# Patient Record
Sex: FEMALE | Race: WHITE | NOT HISPANIC OR LATINO
[De-identification: names, ages, dates, MRNs, and addresses within clinical notes are randomized per-mention and may not be internally consistent; named-entity substitution may affect disease eponyms.]

---

## 2017-01-04 LAB — INR PPP: 2.6

## 2017-01-10 ENCOUNTER — RESULT REVIEW (OUTPATIENT)
Age: 77
End: 2017-01-10

## 2017-01-13 LAB
INR PPP: 2.5
PROTHROMBIN TIME COMMENT: NORMAL

## 2017-01-16 ENCOUNTER — RESULT REVIEW (OUTPATIENT)
Age: 77
End: 2017-01-16

## 2017-01-16 LAB
INR PPP: 2.4
PROTHROMBIN TIME COMMENT: NORMAL

## 2017-01-24 LAB — INR PPP: 2.3

## 2017-01-31 ENCOUNTER — MOBILE ON CALL (OUTPATIENT)
Age: 77
End: 2017-01-31

## 2017-02-15 LAB — INR PPP: 2.2

## 2017-02-21 ENCOUNTER — MEDICATION RENEWAL (OUTPATIENT)
Age: 77
End: 2017-02-21

## 2017-02-22 ENCOUNTER — MEDICATION RENEWAL (OUTPATIENT)
Age: 77
End: 2017-02-22

## 2017-02-22 LAB — INR PPP: 2.9

## 2017-02-23 LAB — INR PPP: 2.4

## 2017-03-16 ENCOUNTER — RESULT REVIEW (OUTPATIENT)
Age: 77
End: 2017-03-16

## 2017-03-17 LAB
INR PPP: 2.4
PROTHROMBIN TIME COMMENT: NORMAL

## 2017-03-22 LAB — INR PPP: 2.1

## 2017-03-28 ENCOUNTER — RESULT REVIEW (OUTPATIENT)
Age: 77
End: 2017-03-28

## 2017-03-29 LAB
INR PPP: 2.3
PROTHROMBIN TIME COMMENT: NORMAL

## 2017-04-12 ENCOUNTER — RESULT REVIEW (OUTPATIENT)
Age: 77
End: 2017-04-12

## 2017-04-12 LAB
INR PPP: 2.2
PROTHROMBIN TIME COMMENT: NORMAL

## 2017-04-17 ENCOUNTER — APPOINTMENT (OUTPATIENT)
Dept: CARDIOLOGY | Facility: CLINIC | Age: 77
End: 2017-04-17

## 2017-04-18 LAB — INR PPP: 1.9

## 2017-04-25 LAB — INR PPP: 2.6

## 2017-05-04 LAB — INR PPP: 2.3

## 2017-05-09 ENCOUNTER — RESULT REVIEW (OUTPATIENT)
Age: 77
End: 2017-05-09

## 2017-05-09 LAB
INR PPP: 2.3
PROTHROMBIN TIME COMMENT: NORMAL

## 2017-05-21 ENCOUNTER — CLINICAL ADVICE (OUTPATIENT)
Age: 77
End: 2017-05-21

## 2017-05-24 LAB — INR PPP: 2.6

## 2017-05-30 LAB
INR PPP: 1.4
PROTHROMBIN TIME COMMENT: NORMAL

## 2017-06-12 LAB — INR PPP: 2.3

## 2017-06-14 ENCOUNTER — APPOINTMENT (OUTPATIENT)
Dept: CARDIOLOGY | Facility: CLINIC | Age: 77
End: 2017-06-14

## 2017-06-14 ENCOUNTER — NON-APPOINTMENT (OUTPATIENT)
Age: 77
End: 2017-06-14

## 2017-06-14 VITALS
OXYGEN SATURATION: 98 % | BODY MASS INDEX: 25.52 KG/M2 | HEART RATE: 80 BPM | HEIGHT: 60 IN | DIASTOLIC BLOOD PRESSURE: 83 MMHG | WEIGHT: 130 LBS | SYSTOLIC BLOOD PRESSURE: 130 MMHG

## 2017-06-22 LAB
INR PPP: 2.4
PROTHROMBIN TIME COMMENT: NORMAL

## 2017-06-26 ENCOUNTER — RESULT REVIEW (OUTPATIENT)
Age: 77
End: 2017-06-26

## 2017-06-26 LAB
INR PPP: 2.4
PROTHROMBIN TIME COMMENT: NORMAL

## 2017-07-19 LAB — INR PPP: 2.2

## 2017-07-27 LAB — INR PPP: 2.4

## 2017-07-31 ENCOUNTER — MEDICATION RENEWAL (OUTPATIENT)
Age: 77
End: 2017-07-31

## 2017-08-01 LAB — INR PPP: 2

## 2017-08-08 ENCOUNTER — RESULT REVIEW (OUTPATIENT)
Age: 77
End: 2017-08-08

## 2017-08-09 LAB
INR PPP: 2.5
PROTHROMBIN TIME COMMENT: NORMAL

## 2017-08-14 ENCOUNTER — RESULT REVIEW (OUTPATIENT)
Age: 77
End: 2017-08-14

## 2017-08-17 LAB
INR PPP: 2.7
PROTHROMBIN TIME COMMENT: NORMAL

## 2017-08-22 LAB
INR PPP: 2
PROTHROMBIN TIME COMMENT: NORMAL

## 2017-08-28 ENCOUNTER — RESULT REVIEW (OUTPATIENT)
Age: 77
End: 2017-08-28

## 2017-08-28 LAB
INR PPP: 1.9
PROTHROMBIN TIME COMMENT: NORMAL

## 2017-09-06 LAB — INR PPP: 2.4

## 2017-09-11 ENCOUNTER — RESULT REVIEW (OUTPATIENT)
Age: 77
End: 2017-09-11

## 2017-09-12 LAB
INR PPP: 2.2
PROTHROMBIN TIME COMMENT: NORMAL

## 2017-09-13 ENCOUNTER — NON-APPOINTMENT (OUTPATIENT)
Age: 77
End: 2017-09-13

## 2017-09-13 ENCOUNTER — APPOINTMENT (OUTPATIENT)
Dept: CARDIOLOGY | Facility: CLINIC | Age: 77
End: 2017-09-13
Payer: MEDICARE

## 2017-09-13 VITALS
HEIGHT: 60 IN | SYSTOLIC BLOOD PRESSURE: 110 MMHG | WEIGHT: 129 LBS | HEART RATE: 76 BPM | BODY MASS INDEX: 25.32 KG/M2 | DIASTOLIC BLOOD PRESSURE: 68 MMHG

## 2017-09-13 PROCEDURE — 93000 ELECTROCARDIOGRAM COMPLETE: CPT

## 2017-09-13 PROCEDURE — 99215 OFFICE O/P EST HI 40 MIN: CPT

## 2017-09-19 LAB — INR PPP: 2.2

## 2017-09-27 LAB — INR PPP: 2.1

## 2017-10-05 ENCOUNTER — MEDICATION RENEWAL (OUTPATIENT)
Age: 77
End: 2017-10-05

## 2017-10-09 ENCOUNTER — RESULT REVIEW (OUTPATIENT)
Age: 77
End: 2017-10-09

## 2017-10-10 LAB
INR PPP: 2.1
PROTHROMBIN TIME COMMENT: NORMAL

## 2017-10-16 ENCOUNTER — RESULT REVIEW (OUTPATIENT)
Age: 77
End: 2017-10-16

## 2017-10-18 LAB
INR PPP: 2.8
PROTHROMBIN TIME COMMENT: NORMAL

## 2017-10-23 ENCOUNTER — RESULT REVIEW (OUTPATIENT)
Age: 77
End: 2017-10-23

## 2017-10-24 ENCOUNTER — NON-APPOINTMENT (OUTPATIENT)
Age: 77
End: 2017-10-24

## 2017-10-24 ENCOUNTER — APPOINTMENT (OUTPATIENT)
Dept: CARDIOLOGY | Facility: CLINIC | Age: 77
End: 2017-10-24
Payer: MEDICARE

## 2017-10-24 VITALS
OXYGEN SATURATION: 97 % | BODY MASS INDEX: 25.72 KG/M2 | WEIGHT: 131 LBS | HEART RATE: 76 BPM | SYSTOLIC BLOOD PRESSURE: 126 MMHG | DIASTOLIC BLOOD PRESSURE: 80 MMHG | HEIGHT: 60 IN

## 2017-10-24 DIAGNOSIS — Z79.899 OTHER LONG TERM (CURRENT) DRUG THERAPY: ICD-10-CM

## 2017-10-24 DIAGNOSIS — I50.9 HEART FAILURE, UNSPECIFIED: ICD-10-CM

## 2017-10-24 LAB
INR PPP: 3
PROTHROMBIN TIME COMMENT: NORMAL

## 2017-10-24 PROCEDURE — 36415 COLL VENOUS BLD VENIPUNCTURE: CPT

## 2017-10-24 PROCEDURE — 99215 OFFICE O/P EST HI 40 MIN: CPT

## 2017-10-24 PROCEDURE — 93000 ELECTROCARDIOGRAM COMPLETE: CPT

## 2017-10-25 PROBLEM — I50.9 ACUTE DECOMPENSATED HEART FAILURE: Status: ACTIVE | Noted: 2017-10-25

## 2017-10-25 PROBLEM — Z79.899 HIGH RISK MEDICATION USE: Status: ACTIVE | Noted: 2017-10-24

## 2017-10-25 LAB
ALBUMIN SERPL ELPH-MCNC: 4.2 G/DL
ALP BLD-CCNC: 130 U/L
ALT SERPL-CCNC: 93 U/L
ANION GAP SERPL CALC-SCNC: 16 MMOL/L
AST SERPL-CCNC: 52 U/L
BASOPHILS # BLD AUTO: 0.02 K/UL
BASOPHILS NFR BLD AUTO: 0.2 %
BILIRUB SERPL-MCNC: 0.5 MG/DL
BUN SERPL-MCNC: 21 MG/DL
CALCIUM SERPL-MCNC: 10.4 MG/DL
CHLORIDE SERPL-SCNC: 99 MMOL/L
CO2 SERPL-SCNC: 28 MMOL/L
CREAT SERPL-MCNC: 0.84 MG/DL
EOSINOPHIL # BLD AUTO: 0.08 K/UL
EOSINOPHIL NFR BLD AUTO: 1 %
GLUCOSE SERPL-MCNC: 94 MG/DL
HCT VFR BLD CALC: 40.7 %
HGB BLD-MCNC: 13.8 G/DL
IMM GRANULOCYTES NFR BLD AUTO: 0.1 %
LYMPHOCYTES # BLD AUTO: 2.5 K/UL
LYMPHOCYTES NFR BLD AUTO: 30.3 %
MAGNESIUM SERPL-MCNC: 2.2 MG/DL
MAN DIFF?: NORMAL
MCHC RBC-ENTMCNC: 33.1 PG
MCHC RBC-ENTMCNC: 33.9 GM/DL
MCV RBC AUTO: 97.6 FL
MONOCYTES # BLD AUTO: 0.48 K/UL
MONOCYTES NFR BLD AUTO: 5.8 %
NEUTROPHILS # BLD AUTO: 5.16 K/UL
NEUTROPHILS NFR BLD AUTO: 62.6 %
NT-PROBNP SERPL-MCNC: 1524 PG/ML
PLATELET # BLD AUTO: 194 K/UL
POTASSIUM SERPL-SCNC: 3.5 MMOL/L
PROT SERPL-MCNC: 7.5 G/DL
RBC # BLD: 4.17 M/UL
RBC # FLD: 14.9 %
SODIUM SERPL-SCNC: 143 MMOL/L
WBC # FLD AUTO: 8.25 K/UL

## 2017-10-27 ENCOUNTER — APPOINTMENT (OUTPATIENT)
Dept: CARDIOLOGY | Facility: CLINIC | Age: 77
End: 2017-10-27
Payer: MEDICARE

## 2017-10-27 PROCEDURE — 93306 TTE W/DOPPLER COMPLETE: CPT

## 2017-11-02 ENCOUNTER — APPOINTMENT (OUTPATIENT)
Dept: CARDIOLOGY | Facility: CLINIC | Age: 77
End: 2017-11-02
Payer: MEDICARE

## 2017-11-02 VITALS
HEART RATE: 70 BPM | SYSTOLIC BLOOD PRESSURE: 102 MMHG | DIASTOLIC BLOOD PRESSURE: 70 MMHG | BODY MASS INDEX: 24.94 KG/M2 | HEIGHT: 60 IN | WEIGHT: 127 LBS

## 2017-11-02 LAB — INR PPP: 2.1

## 2017-11-02 PROCEDURE — 36415 COLL VENOUS BLD VENIPUNCTURE: CPT

## 2017-11-02 PROCEDURE — 99215 OFFICE O/P EST HI 40 MIN: CPT

## 2017-11-03 LAB
ALBUMIN SERPL ELPH-MCNC: 4.3 G/DL
ALP BLD-CCNC: 87 U/L
ALT SERPL-CCNC: 37 U/L
ANION GAP SERPL CALC-SCNC: 17 MMOL/L
AST SERPL-CCNC: 44 U/L
BILIRUB DIRECT SERPL-MCNC: 0.1 MG/DL
BILIRUB INDIRECT SERPL-MCNC: 0.5 MG/DL
BILIRUB SERPL-MCNC: 0.6 MG/DL
BUN SERPL-MCNC: 18 MG/DL
CALCIUM SERPL-MCNC: 9.9 MG/DL
CHLORIDE SERPL-SCNC: 95 MMOL/L
CO2 SERPL-SCNC: 27 MMOL/L
CREAT SERPL-MCNC: 0.47 MG/DL
GLUCOSE SERPL-MCNC: 81 MG/DL
MAGNESIUM SERPL-MCNC: 2.2 MG/DL
POTASSIUM SERPL-SCNC: 3.9 MMOL/L
PROT SERPL-MCNC: 7.7 G/DL
SODIUM SERPL-SCNC: 139 MMOL/L

## 2017-11-13 ENCOUNTER — OUTPATIENT (OUTPATIENT)
Dept: OUTPATIENT SERVICES | Facility: HOSPITAL | Age: 77
LOS: 1 days | End: 2017-11-13
Payer: MEDICARE

## 2017-11-13 ENCOUNTER — APPOINTMENT (OUTPATIENT)
Dept: CV DIAGNOSITCS | Facility: HOSPITAL | Age: 77
End: 2017-11-13

## 2017-11-13 ENCOUNTER — CLINICAL ADVICE (OUTPATIENT)
Age: 77
End: 2017-11-13

## 2017-11-13 DIAGNOSIS — I48.91 UNSPECIFIED ATRIAL FIBRILLATION: ICD-10-CM

## 2017-11-13 DIAGNOSIS — I34.0 NONRHEUMATIC MITRAL (VALVE) INSUFFICIENCY: ICD-10-CM

## 2017-11-13 DIAGNOSIS — I50.9 HEART FAILURE, UNSPECIFIED: ICD-10-CM

## 2017-11-13 LAB
INR BLD: 2.21 — HIGH (ref 0.88–1.17)
INR PPP: 2.1
PROTHROM AB SERPL-ACNC: 25.2 SEC — HIGH (ref 9.8–13.1)

## 2017-11-13 PROCEDURE — 93312 ECHO TRANSESOPHAGEAL: CPT | Mod: 26

## 2017-11-13 PROCEDURE — 93306 TTE W/DOPPLER COMPLETE: CPT | Mod: 26

## 2017-11-13 PROCEDURE — 76376 3D RENDER W/INTRP POSTPROCES: CPT | Mod: 26

## 2017-11-13 RX ORDER — SODIUM CHLORIDE 9 MG/ML
3 INJECTION INTRAMUSCULAR; INTRAVENOUS; SUBCUTANEOUS ONCE
Qty: 0 | Refills: 0 | Status: DISCONTINUED | OUTPATIENT
Start: 2017-11-13 | End: 2017-11-28

## 2017-11-14 ENCOUNTER — OTHER (OUTPATIENT)
Age: 77
End: 2017-11-14

## 2017-11-14 ENCOUNTER — APPOINTMENT (OUTPATIENT)
Dept: CARDIOLOGY | Facility: CLINIC | Age: 77
End: 2017-11-14

## 2017-11-14 ENCOUNTER — APPOINTMENT (OUTPATIENT)
Dept: CARDIOTHORACIC SURGERY | Facility: CLINIC | Age: 77
End: 2017-11-14

## 2017-11-21 ENCOUNTER — RESULT REVIEW (OUTPATIENT)
Age: 77
End: 2017-11-21

## 2017-11-21 LAB
INR PPP: 2.2
PROTHROMBIN TIME COMMENT: NORMAL

## 2017-11-27 LAB — INR PPP: 1.3

## 2017-11-28 ENCOUNTER — RESULT REVIEW (OUTPATIENT)
Age: 77
End: 2017-11-28

## 2017-11-28 ENCOUNTER — OUTPATIENT (OUTPATIENT)
Dept: OUTPATIENT SERVICES | Facility: HOSPITAL | Age: 77
LOS: 1 days | End: 2017-11-28
Payer: MEDICARE

## 2017-11-28 DIAGNOSIS — Z80.0 FAMILY HISTORY OF MALIGNANT NEOPLASM OF DIGESTIVE ORGANS: ICD-10-CM

## 2017-11-28 DIAGNOSIS — K58.0 IRRITABLE BOWEL SYNDROME WITH DIARRHEA: ICD-10-CM

## 2017-11-28 PROCEDURE — 88305 TISSUE EXAM BY PATHOLOGIST: CPT

## 2017-11-28 PROCEDURE — 88305 TISSUE EXAM BY PATHOLOGIST: CPT | Mod: 26

## 2017-11-28 PROCEDURE — 45380 COLONOSCOPY AND BIOPSY: CPT

## 2017-11-29 LAB — SURGICAL PATHOLOGY STUDY: SIGNIFICANT CHANGE UP

## 2017-12-04 ENCOUNTER — NON-APPOINTMENT (OUTPATIENT)
Age: 77
End: 2017-12-04

## 2017-12-04 ENCOUNTER — APPOINTMENT (OUTPATIENT)
Dept: CARDIOLOGY | Facility: CLINIC | Age: 77
End: 2017-12-04
Payer: MEDICARE

## 2017-12-04 VITALS
DIASTOLIC BLOOD PRESSURE: 70 MMHG | SYSTOLIC BLOOD PRESSURE: 100 MMHG | HEIGHT: 60 IN | WEIGHT: 125 LBS | BODY MASS INDEX: 24.54 KG/M2

## 2017-12-04 LAB
INR PPP: 1.7
PROTHROMBIN TIME COMMENT: NORMAL

## 2017-12-04 PROCEDURE — 93000 ELECTROCARDIOGRAM COMPLETE: CPT

## 2017-12-04 PROCEDURE — 99215 OFFICE O/P EST HI 40 MIN: CPT

## 2017-12-04 RX ORDER — DICYCLOMINE HYDROCHLORIDE 10 MG/1
10 CAPSULE ORAL
Qty: 50 | Refills: 0 | Status: DISCONTINUED | COMMUNITY
Start: 2016-12-08

## 2017-12-04 RX ORDER — SODIUM SULFATE, POTASSIUM SULFATE, MAGNESIUM SULFATE 17.5; 3.13; 1.6 G/ML; G/ML; G/ML
17.5-3.13-1.6 SOLUTION, CONCENTRATE ORAL
Qty: 354 | Refills: 0 | Status: DISCONTINUED | COMMUNITY
Start: 2017-10-19

## 2017-12-12 ENCOUNTER — RESULT REVIEW (OUTPATIENT)
Age: 77
End: 2017-12-12

## 2017-12-12 LAB
INR PPP: 2.7
PROTHROMBIN TIME COMMENT: NORMAL

## 2017-12-15 ENCOUNTER — NON-APPOINTMENT (OUTPATIENT)
Age: 77
End: 2017-12-15

## 2017-12-15 ENCOUNTER — APPOINTMENT (OUTPATIENT)
Dept: CARDIOLOGY | Facility: CLINIC | Age: 77
End: 2017-12-15
Payer: MEDICARE

## 2017-12-15 VITALS
DIASTOLIC BLOOD PRESSURE: 85 MMHG | OXYGEN SATURATION: 97 % | WEIGHT: 125 LBS | HEART RATE: 75 BPM | BODY MASS INDEX: 25.2 KG/M2 | SYSTOLIC BLOOD PRESSURE: 126 MMHG | HEIGHT: 59 IN

## 2017-12-15 PROCEDURE — 93000 ELECTROCARDIOGRAM COMPLETE: CPT

## 2017-12-15 PROCEDURE — 99205 OFFICE O/P NEW HI 60 MIN: CPT

## 2017-12-20 LAB — INR PPP: 3.3

## 2017-12-26 ENCOUNTER — RESULT REVIEW (OUTPATIENT)
Age: 77
End: 2017-12-26

## 2017-12-27 LAB
INR PPP: 2.3
PROTHROMBIN TIME COMMENT: NORMAL

## 2018-01-09 LAB
INR PPP: 2.9
PROTHROMBIN TIME COMMENT: NORMAL

## 2018-01-15 LAB
INR PPP: 4.5
PROTHROMBIN TIME COMMENT: NORMAL

## 2018-02-06 LAB — INR PPP: 3

## 2018-02-12 LAB — INR PPP: 3.2

## 2018-02-20 LAB — INR PPP: 3.6

## 2018-02-26 LAB — INR PPP: 4.8

## 2018-03-01 LAB
INR PPP: 2.2
PROTHROMBIN TIME COMMENT: NORMAL

## 2018-03-02 ENCOUNTER — APPOINTMENT (OUTPATIENT)
Dept: CARDIOLOGY | Facility: CLINIC | Age: 78
End: 2018-03-02
Payer: MEDICARE

## 2018-03-02 ENCOUNTER — NON-APPOINTMENT (OUTPATIENT)
Age: 78
End: 2018-03-02

## 2018-03-02 VITALS
SYSTOLIC BLOOD PRESSURE: 102 MMHG | DIASTOLIC BLOOD PRESSURE: 80 MMHG | HEART RATE: 92 BPM | BODY MASS INDEX: 23.03 KG/M2 | WEIGHT: 114 LBS | OXYGEN SATURATION: 95 %

## 2018-03-02 PROCEDURE — 36415 COLL VENOUS BLD VENIPUNCTURE: CPT

## 2018-03-02 PROCEDURE — 93000 ELECTROCARDIOGRAM COMPLETE: CPT

## 2018-03-02 PROCEDURE — 99214 OFFICE O/P EST MOD 30 MIN: CPT

## 2018-03-02 RX ORDER — TORSEMIDE 10 MG/1
10 TABLET ORAL DAILY
Qty: 30 | Refills: 2 | Status: DISCONTINUED | COMMUNITY
Start: 2017-10-24 | End: 2018-03-02

## 2018-03-05 LAB
ALBUMIN SERPL ELPH-MCNC: 4.2 G/DL
ALP BLD-CCNC: 103 U/L
ALT SERPL-CCNC: 24 U/L
ANION GAP SERPL CALC-SCNC: 15 MMOL/L
AST SERPL-CCNC: 27 U/L
BASOPHILS # BLD AUTO: 0.01 K/UL
BASOPHILS NFR BLD AUTO: 0.1 %
BILIRUB SERPL-MCNC: 0.7 MG/DL
BUN SERPL-MCNC: 15 MG/DL
CALCIUM SERPL-MCNC: 10.5 MG/DL
CHLORIDE SERPL-SCNC: 102 MMOL/L
CHOLEST SERPL-MCNC: 201 MG/DL
CHOLEST/HDLC SERPL: 2.9 RATIO
CO2 SERPL-SCNC: 27 MMOL/L
CREAT SERPL-MCNC: 0.97 MG/DL
EOSINOPHIL # BLD AUTO: 0.02 K/UL
EOSINOPHIL NFR BLD AUTO: 0.2 %
GLUCOSE SERPL-MCNC: 173 MG/DL
HCT VFR BLD CALC: 45.1 %
HDLC SERPL-MCNC: 70 MG/DL
HGB BLD-MCNC: 14.8 G/DL
IMM GRANULOCYTES NFR BLD AUTO: 0.3 %
INR PPP: 1.1
LDLC SERPL CALC-MCNC: 114 MG/DL
LYMPHOCYTES # BLD AUTO: 2.15 K/UL
LYMPHOCYTES NFR BLD AUTO: 18.6 %
MAN DIFF?: NORMAL
MCHC RBC-ENTMCNC: 32.3 PG
MCHC RBC-ENTMCNC: 32.8 GM/DL
MCV RBC AUTO: 98.5 FL
MONOCYTES # BLD AUTO: 0.45 K/UL
MONOCYTES NFR BLD AUTO: 3.9 %
NEUTROPHILS # BLD AUTO: 8.9 K/UL
NEUTROPHILS NFR BLD AUTO: 76.9 %
PLATELET # BLD AUTO: 217 K/UL
POTASSIUM SERPL-SCNC: 4.1 MMOL/L
PROT SERPL-MCNC: 7.2 G/DL
RBC # BLD: 4.58 M/UL
RBC # FLD: 14.9 %
SODIUM SERPL-SCNC: 144 MMOL/L
TRIGL SERPL-MCNC: 87 MG/DL
TSH SERPL-ACNC: 0.93 UIU/ML
WBC # FLD AUTO: 11.56 K/UL

## 2018-03-12 LAB — INR PPP: 1.6

## 2018-03-20 LAB
INR PPP: 1.4
PROTHROMBIN TIME COMMENT: NORMAL

## 2018-03-26 LAB
INR PPP: 2.6
PROTHROMBIN TIME COMMENT: NORMAL

## 2018-04-02 ENCOUNTER — MEDICATION RENEWAL (OUTPATIENT)
Age: 78
End: 2018-04-02

## 2018-04-02 LAB
INR PPP: 4.1
PROTHROMBIN TIME COMMENT: NORMAL

## 2018-04-04 ENCOUNTER — CLINICAL ADVICE (OUTPATIENT)
Age: 78
End: 2018-04-04

## 2018-04-05 LAB — INR PPP: 1.9

## 2018-04-16 ENCOUNTER — RESULT REVIEW (OUTPATIENT)
Age: 78
End: 2018-04-16

## 2018-04-16 LAB
INR PPP: 4.7
PROTHROMBIN TIME COMMENT: NORMAL

## 2018-04-18 ENCOUNTER — RESULT REVIEW (OUTPATIENT)
Age: 78
End: 2018-04-18

## 2018-04-18 LAB
INR PPP: 2.2
PROTHROMBIN TIME COMMENT: NORMAL

## 2018-04-25 LAB — INR PPP: 2.9

## 2018-05-02 ENCOUNTER — MEDICATION RENEWAL (OUTPATIENT)
Age: 78
End: 2018-05-02

## 2018-05-15 LAB
INR PPP: 2.7
PROTHROMBIN TIME COMMENT: NORMAL

## 2018-05-16 LAB — INR PPP: 3.1

## 2018-05-29 LAB — INR PPP: 3.2

## 2018-06-04 LAB — INR PPP: 2.8

## 2018-06-06 ENCOUNTER — RESULT REVIEW (OUTPATIENT)
Age: 78
End: 2018-06-06

## 2018-06-06 ENCOUNTER — NON-APPOINTMENT (OUTPATIENT)
Age: 78
End: 2018-06-06

## 2018-06-06 ENCOUNTER — APPOINTMENT (OUTPATIENT)
Dept: CARDIOLOGY | Facility: CLINIC | Age: 78
End: 2018-06-06
Payer: MEDICARE

## 2018-06-06 VITALS
HEART RATE: 86 BPM | DIASTOLIC BLOOD PRESSURE: 78 MMHG | SYSTOLIC BLOOD PRESSURE: 124 MMHG | HEIGHT: 59 IN | BODY MASS INDEX: 22.98 KG/M2 | WEIGHT: 114 LBS

## 2018-06-06 PROCEDURE — 99214 OFFICE O/P EST MOD 30 MIN: CPT

## 2018-06-06 PROCEDURE — 93000 ELECTROCARDIOGRAM COMPLETE: CPT

## 2018-06-07 LAB
ALBUMIN SERPL ELPH-MCNC: 4.5 G/DL
ALP BLD-CCNC: 122 U/L
ALT SERPL-CCNC: 28 U/L
ANION GAP SERPL CALC-SCNC: 14 MMOL/L
AST SERPL-CCNC: 25 U/L
BASOPHILS # BLD AUTO: 0.01 K/UL
BASOPHILS NFR BLD AUTO: 0.1 %
BILIRUB SERPL-MCNC: 0.4 MG/DL
BUN SERPL-MCNC: 18 MG/DL
CALCIUM SERPL-MCNC: 9.9 MG/DL
CHLORIDE SERPL-SCNC: 104 MMOL/L
CHOLEST SERPL-MCNC: 186 MG/DL
CHOLEST/HDLC SERPL: 2.5 RATIO
CO2 SERPL-SCNC: 26 MMOL/L
CREAT SERPL-MCNC: 0.76 MG/DL
EOSINOPHIL # BLD AUTO: 0 K/UL
EOSINOPHIL NFR BLD AUTO: 0 %
GLUCOSE SERPL-MCNC: 116 MG/DL
HBA1C MFR BLD HPLC: 5.7 %
HCT VFR BLD CALC: 45.2 %
HDLC SERPL-MCNC: 75 MG/DL
HGB BLD-MCNC: 14.6 G/DL
IMM GRANULOCYTES NFR BLD AUTO: 0.3 %
LDLC SERPL CALC-MCNC: 93 MG/DL
LYMPHOCYTES # BLD AUTO: 1.92 K/UL
LYMPHOCYTES NFR BLD AUTO: 27.8 %
MAN DIFF?: NORMAL
MCHC RBC-ENTMCNC: 32.3 GM/DL
MCHC RBC-ENTMCNC: 33.3 PG
MCV RBC AUTO: 103 FL
MONOCYTES # BLD AUTO: 0.44 K/UL
MONOCYTES NFR BLD AUTO: 6.4 %
NEUTROPHILS # BLD AUTO: 4.52 K/UL
NEUTROPHILS NFR BLD AUTO: 65.4 %
PLATELET # BLD AUTO: 191 K/UL
POTASSIUM SERPL-SCNC: 4.9 MMOL/L
PROT SERPL-MCNC: 7.2 G/DL
RBC # BLD: 4.39 M/UL
RBC # FLD: 14 %
SODIUM SERPL-SCNC: 144 MMOL/L
TRIGL SERPL-MCNC: 92 MG/DL
WBC # FLD AUTO: 6.91 K/UL

## 2018-06-12 LAB
INR PPP: 2.3
PROTHROMBIN TIME COMMENT: NORMAL

## 2018-06-13 LAB — INR PPP: 3

## 2018-06-15 ENCOUNTER — NON-APPOINTMENT (OUTPATIENT)
Age: 78
End: 2018-06-15

## 2018-06-15 ENCOUNTER — APPOINTMENT (OUTPATIENT)
Dept: CARDIOTHORACIC SURGERY | Facility: CLINIC | Age: 78
End: 2018-06-15
Payer: MEDICARE

## 2018-06-15 ENCOUNTER — OUTPATIENT (OUTPATIENT)
Dept: OUTPATIENT SERVICES | Facility: HOSPITAL | Age: 78
LOS: 1 days | End: 2018-06-15
Payer: MEDICARE

## 2018-06-15 ENCOUNTER — APPOINTMENT (OUTPATIENT)
Dept: CV DIAGNOSITCS | Facility: HOSPITAL | Age: 78
End: 2018-06-15

## 2018-06-15 VITALS
HEIGHT: 58 IN | TEMPERATURE: 98.3 F | RESPIRATION RATE: 15 BRPM | OXYGEN SATURATION: 98 % | WEIGHT: 114 LBS | SYSTOLIC BLOOD PRESSURE: 112 MMHG | BODY MASS INDEX: 23.93 KG/M2 | DIASTOLIC BLOOD PRESSURE: 78 MMHG | HEART RATE: 84 BPM

## 2018-06-15 DIAGNOSIS — I07.1 RHEUMATIC TRICUSPID INSUFFICIENCY: ICD-10-CM

## 2018-06-15 PROCEDURE — 99215 OFFICE O/P EST HI 40 MIN: CPT | Mod: 25

## 2018-06-15 PROCEDURE — 93306 TTE W/DOPPLER COMPLETE: CPT | Mod: 26

## 2018-06-15 PROCEDURE — 93000 ELECTROCARDIOGRAM COMPLETE: CPT

## 2018-06-15 PROCEDURE — 93306 TTE W/DOPPLER COMPLETE: CPT

## 2018-06-15 RX ORDER — CLONAZEPAM 0.5 MG/1
0.5 TABLET ORAL
Qty: 30 | Refills: 0 | Status: ACTIVE | COMMUNITY
Start: 2018-03-14

## 2018-06-15 RX ORDER — PAROXETINE HYDROCHLORIDE 30 MG/1
30 TABLET, FILM COATED ORAL
Qty: 90 | Refills: 0 | Status: DISCONTINUED | COMMUNITY
Start: 2017-04-28

## 2018-06-15 RX ORDER — QUETIAPINE 50 MG/1
50 TABLET, FILM COATED, EXTENDED RELEASE ORAL
Qty: 60 | Refills: 0 | Status: DISCONTINUED | COMMUNITY
Start: 2018-03-17

## 2018-06-15 RX ORDER — DULOXETINE HYDROCHLORIDE 20 MG/1
20 CAPSULE, DELAYED RELEASE PELLETS ORAL
Qty: 60 | Refills: 0 | Status: DISCONTINUED | COMMUNITY
Start: 2018-03-16

## 2018-06-15 RX ORDER — LORAZEPAM 1 MG/1
1 TABLET ORAL
Refills: 0 | Status: ACTIVE | COMMUNITY
Start: 2018-01-26

## 2018-06-15 RX ORDER — DULOXETINE HYDROCHLORIDE 30 MG/1
30 CAPSULE, DELAYED RELEASE PELLETS ORAL
Qty: 30 | Refills: 0 | Status: DISCONTINUED | COMMUNITY
Start: 2018-04-27

## 2018-06-15 RX ORDER — MIRTAZAPINE 45 MG/1
45 TABLET, FILM COATED ORAL
Qty: 30 | Refills: 0 | Status: DISCONTINUED | COMMUNITY
Start: 2018-03-14

## 2018-06-15 RX ORDER — MIRTAZAPINE 15 MG/1
15 TABLET, FILM COATED ORAL
Qty: 30 | Refills: 0 | Status: DISCONTINUED | COMMUNITY
Start: 2018-02-26 | End: 2018-06-15

## 2018-06-15 RX ORDER — QUETIAPINE 200 MG/1
200 TABLET, FILM COATED, EXTENDED RELEASE ORAL
Qty: 30 | Refills: 0 | Status: DISCONTINUED | COMMUNITY
Start: 2018-06-11

## 2018-06-15 RX ORDER — QUETIAPINE 150 MG/1
150 TABLET, FILM COATED, EXTENDED RELEASE ORAL
Qty: 30 | Refills: 0 | Status: ACTIVE | COMMUNITY
Start: 2018-04-09

## 2018-06-15 RX ORDER — QUETIAPINE 25 MG/1
25 TABLET, FILM COATED ORAL
Refills: 0 | Status: DISCONTINUED | COMMUNITY
Start: 2018-06-06 | End: 2018-06-15

## 2018-06-26 LAB
INR PPP: 2.8
PROTHROMBIN TIME COMMENT: NORMAL

## 2018-06-28 LAB — INR PPP: 2.3

## 2018-07-09 ENCOUNTER — OUTPATIENT (OUTPATIENT)
Dept: OUTPATIENT SERVICES | Facility: HOSPITAL | Age: 78
LOS: 1 days | Discharge: HOME | End: 2018-07-09
Payer: MEDICARE

## 2018-07-09 DIAGNOSIS — F33.9 MAJOR DEPRESSIVE DISORDER, RECURRENT, UNSPECIFIED: ICD-10-CM

## 2018-07-11 LAB — INR PPP: 2.3

## 2018-07-18 LAB — INR PPP: 1.8

## 2018-08-01 LAB — INR PPP: 2.5

## 2018-08-07 ENCOUNTER — APPOINTMENT (OUTPATIENT)
Dept: CARDIOLOGY | Facility: CLINIC | Age: 78
End: 2018-08-07

## 2018-08-10 LAB — INR PPP: 2.3

## 2018-08-29 LAB — INR PPP: 2.9

## 2018-09-12 ENCOUNTER — RESULT REVIEW (OUTPATIENT)
Age: 78
End: 2018-09-12

## 2018-09-13 LAB
INR PPP: 2.9
PROTHROMBIN TIME COMMENT: NORMAL

## 2018-09-20 LAB
INR PPP: 2.5
PROTHROMBIN TIME COMMENT: NORMAL

## 2018-09-26 ENCOUNTER — RESULT REVIEW (OUTPATIENT)
Age: 78
End: 2018-09-26

## 2018-09-27 LAB
INR PPP: 2.7
PROTHROMBIN TIME COMMENT: NORMAL

## 2018-10-10 LAB — INR PPP: 3.2

## 2018-10-15 LAB — INR PPP: 2

## 2018-10-30 LAB
INR PPP: 3.4
PROTHROMBIN TIME COMMENT: NORMAL

## 2018-11-27 ENCOUNTER — NON-APPOINTMENT (OUTPATIENT)
Age: 78
End: 2018-11-27

## 2018-11-27 ENCOUNTER — APPOINTMENT (OUTPATIENT)
Dept: CARDIOLOGY | Facility: CLINIC | Age: 78
End: 2018-11-27
Payer: MEDICARE

## 2018-11-27 VITALS — SYSTOLIC BLOOD PRESSURE: 140 MMHG | DIASTOLIC BLOOD PRESSURE: 100 MMHG

## 2018-11-27 VITALS
SYSTOLIC BLOOD PRESSURE: 154 MMHG | WEIGHT: 120 LBS | BODY MASS INDEX: 25.19 KG/M2 | HEART RATE: 97 BPM | OXYGEN SATURATION: 96 % | DIASTOLIC BLOOD PRESSURE: 103 MMHG | HEIGHT: 58 IN

## 2018-11-27 VITALS — DIASTOLIC BLOOD PRESSURE: 89 MMHG | SYSTOLIC BLOOD PRESSURE: 142 MMHG

## 2018-11-27 PROCEDURE — 99214 OFFICE O/P EST MOD 30 MIN: CPT

## 2018-11-27 PROCEDURE — 93000 ELECTROCARDIOGRAM COMPLETE: CPT

## 2018-11-27 PROCEDURE — 85610 PROTHROMBIN TIME: CPT | Mod: QW

## 2018-11-27 RX ORDER — DULOXETINE HYDROCHLORIDE 60 MG/1
60 CAPSULE, DELAYED RELEASE PELLETS ORAL
Qty: 30 | Refills: 0 | Status: DISCONTINUED | COMMUNITY
Start: 2018-03-23 | End: 2018-11-27

## 2018-11-27 NOTE — HISTORY OF PRESENT ILLNESS
[FreeTextEntry1] : She presents in scheduled followup accompanied by her daughter reporting that she has been feeling well with he exception of ongoing depression.  Trintillix recently added.  Inactive.\par \par No dyspnea or PND. Edema has not recurred.\par No c/o chest, throat,jaw, arm or upper back discomfort.  No dizziness or syncope.   No  claudication.\par \par No anticoagulation related bleeding problems.

## 2018-11-27 NOTE — REASON FOR VISIT
[Anticoagulation] : anticoagulation [Atrial Fibrillation] : atrial fibrillation [Dyspnea] : dyspnea [Heart Failure] : congestive heart failure [Hyperlipidemia] : hyperlipidemia [Hypertension] : hypertension [Mitral Regurgitation] : mitral regurgitation [FreeTextEntry1] : \par

## 2018-12-05 ENCOUNTER — APPOINTMENT (OUTPATIENT)
Dept: CARDIOLOGY | Facility: CLINIC | Age: 78
End: 2018-12-05

## 2018-12-13 LAB — INR PPP: 2.3

## 2019-01-03 LAB — INR PPP: 1.8

## 2019-01-16 ENCOUNTER — RESULT REVIEW (OUTPATIENT)
Age: 79
End: 2019-01-16

## 2019-01-17 LAB
INR PPP: 2.5
PROTHROMBIN TIME COMMENT: NORMAL

## 2019-01-23 ENCOUNTER — OUTPATIENT (OUTPATIENT)
Dept: OUTPATIENT SERVICES | Facility: HOSPITAL | Age: 79
LOS: 1 days | End: 2019-01-23
Payer: MEDICARE

## 2019-01-23 ENCOUNTER — APPOINTMENT (OUTPATIENT)
Dept: ULTRASOUND IMAGING | Facility: IMAGING CENTER | Age: 79
End: 2019-01-23
Payer: MEDICARE

## 2019-01-23 DIAGNOSIS — Z00.8 ENCOUNTER FOR OTHER GENERAL EXAMINATION: ICD-10-CM

## 2019-01-23 PROCEDURE — 93975 VASCULAR STUDY: CPT

## 2019-01-23 PROCEDURE — 76700 US EXAM ABDOM COMPLETE: CPT | Mod: 26,59

## 2019-01-23 PROCEDURE — 93976 VASCULAR STUDY: CPT

## 2019-01-23 PROCEDURE — 93976 VASCULAR STUDY: CPT | Mod: 26

## 2019-01-23 PROCEDURE — 76700 US EXAM ABDOM COMPLETE: CPT | Mod: XU

## 2019-02-13 ENCOUNTER — RESULT REVIEW (OUTPATIENT)
Age: 79
End: 2019-02-13

## 2019-02-14 LAB
INR PPP: 1.9
PROTHROMBIN TIME COMMENT: NORMAL

## 2019-03-07 LAB
INR PPP: 2.5
PROTHROMBIN TIME COMMENT: NORMAL

## 2019-03-25 LAB
INR PPP: 2.1
PROTHROMBIN TIME COMMENT: NORMAL

## 2019-03-28 LAB
INR PPP: 2.3
PROTHROMBIN TIME COMMENT: NORMAL

## 2019-04-04 LAB
INR PPP: 2.6
PROTHROMBIN TIME COMMENT: NORMAL

## 2019-04-17 ENCOUNTER — RESULT REVIEW (OUTPATIENT)
Age: 79
End: 2019-04-17

## 2019-04-17 LAB
INR PPP: 2.6
PROTHROMBIN TIME COMMENT: NORMAL

## 2019-04-25 ENCOUNTER — RESULT REVIEW (OUTPATIENT)
Age: 79
End: 2019-04-25

## 2019-04-26 LAB
INR PPP: 2.9
PROTHROMBIN TIME COMMENT: NORMAL

## 2019-05-01 ENCOUNTER — RESULT REVIEW (OUTPATIENT)
Age: 79
End: 2019-05-01

## 2019-05-02 LAB
INR PPP: 2.7
PROTHROMBIN TIME COMMENT: NORMAL

## 2019-05-08 ENCOUNTER — APPOINTMENT (OUTPATIENT)
Dept: CARDIOLOGY | Facility: CLINIC | Age: 79
End: 2019-05-08
Payer: MEDICARE

## 2019-05-08 ENCOUNTER — NON-APPOINTMENT (OUTPATIENT)
Age: 79
End: 2019-05-08

## 2019-05-08 VITALS
BODY MASS INDEX: 24.56 KG/M2 | HEART RATE: 94 BPM | HEIGHT: 58 IN | WEIGHT: 117 LBS | SYSTOLIC BLOOD PRESSURE: 156 MMHG | DIASTOLIC BLOOD PRESSURE: 98 MMHG | OXYGEN SATURATION: 97 %

## 2019-05-08 VITALS — SYSTOLIC BLOOD PRESSURE: 134 MMHG | DIASTOLIC BLOOD PRESSURE: 86 MMHG

## 2019-05-08 PROCEDURE — 93000 ELECTROCARDIOGRAM COMPLETE: CPT

## 2019-05-08 PROCEDURE — 85610 PROTHROMBIN TIME: CPT | Mod: QW

## 2019-05-08 PROCEDURE — 99214 OFFICE O/P EST MOD 30 MIN: CPT

## 2019-05-08 NOTE — HISTORY OF PRESENT ILLNESS
[FreeTextEntry1] : She presents in scheduled followup accompanied by her daughter reporting that she has been feeling well. Much less depressed. ADL are well-tolerated..\par \par No dyspnea, orthopnea or PND. Edema has not recurred.\par No c/o chest, throat,jaw, arm or upper back discomfort.  No dizziness or syncope.   No  claudication.\par \par No anticoagulation related bleeding problems.\par \par Dr. Davis is following labsregularly

## 2019-05-08 NOTE — REASON FOR VISIT
[Anticoagulation] : anticoagulation [Atrial Fibrillation] : atrial fibrillation [Dyspnea] : dyspnea [Hypertension] : hypertension [Hyperlipidemia] : hyperlipidemia [Heart Failure] : congestive heart failure [Mitral Regurgitation] : mitral regurgitation [FreeTextEntry1] : \par

## 2019-05-22 ENCOUNTER — RESULT REVIEW (OUTPATIENT)
Age: 79
End: 2019-05-22

## 2019-05-23 LAB
INR PPP: 2.6
PROTHROMBIN TIME COMMENT: NORMAL

## 2019-05-29 ENCOUNTER — RESULT REVIEW (OUTPATIENT)
Age: 79
End: 2019-05-29

## 2019-05-30 LAB
INR PPP: 2.4
PROTHROMBIN TIME COMMENT: NORMAL

## 2019-06-13 LAB
INR PPP: 2.8
PROTHROMBIN TIME COMMENT: NORMAL

## 2019-06-20 LAB
INR PPP: 2
PROTHROMBIN TIME COMMENT: NORMAL

## 2019-07-03 ENCOUNTER — RESULT REVIEW (OUTPATIENT)
Age: 79
End: 2019-07-03

## 2019-07-05 LAB
INR PPP: 2.4
PROTHROMBIN TIME COMMENT: NORMAL

## 2019-07-08 ENCOUNTER — MEDICATION RENEWAL (OUTPATIENT)
Age: 79
End: 2019-07-08

## 2019-07-09 ENCOUNTER — MEDICATION RENEWAL (OUTPATIENT)
Age: 79
End: 2019-07-09

## 2019-07-31 ENCOUNTER — RESULT REVIEW (OUTPATIENT)
Age: 79
End: 2019-07-31

## 2019-08-01 LAB
INR PPP: 2.1
PROTHROMBIN TIME COMMENT: NORMAL

## 2019-08-13 ENCOUNTER — RESULT REVIEW (OUTPATIENT)
Age: 79
End: 2019-08-13

## 2019-08-13 LAB
INR PPP: 1.9
PROTHROMBIN TIME COMMENT: NORMAL

## 2019-08-29 ENCOUNTER — RESULT REVIEW (OUTPATIENT)
Age: 79
End: 2019-08-29

## 2019-09-03 LAB
INR PPP: 2.4
PROTHROMBIN TIME COMMENT: NORMAL

## 2019-09-07 ENCOUNTER — RX RENEWAL (OUTPATIENT)
Age: 79
End: 2019-09-07

## 2019-10-03 LAB
INR PPP: 2.5
PROTHROMBIN TIME COMMENT: NORMAL

## 2019-10-09 ENCOUNTER — RESULT REVIEW (OUTPATIENT)
Age: 79
End: 2019-10-09

## 2019-10-10 LAB
INR PPP: 2.2
PROTHROMBIN TIME COMMENT: NORMAL

## 2019-10-23 ENCOUNTER — RESULT REVIEW (OUTPATIENT)
Age: 79
End: 2019-10-23

## 2019-10-28 LAB
INR PPP: 2.2
PROTHROMBIN TIME COMMENT: NORMAL

## 2019-10-30 ENCOUNTER — RESULT CHARGE (OUTPATIENT)
Age: 79
End: 2019-10-30

## 2019-10-30 ENCOUNTER — RESULT REVIEW (OUTPATIENT)
Age: 79
End: 2019-10-30

## 2019-11-01 LAB
INR PPP: 2.5
PROTHROMBIN TIME COMMENT: NORMAL

## 2019-11-06 ENCOUNTER — RESULT REVIEW (OUTPATIENT)
Age: 79
End: 2019-11-06

## 2019-11-12 LAB
INR PPP: 2.2
PROTHROMBIN TIME COMMENT: NORMAL

## 2019-11-14 LAB
INR PPP: 1.6
PROTHROMBIN TIME COMMENT: NORMAL

## 2019-11-20 ENCOUNTER — RESULT REVIEW (OUTPATIENT)
Age: 79
End: 2019-11-20

## 2019-11-21 LAB
INR PPP: 2.5
PROTHROMBIN TIME COMMENT: NORMAL

## 2019-12-18 LAB
INR PPP: 1.9
PROTHROMBIN TIME COMMENT: NORMAL

## 2019-12-30 ENCOUNTER — APPOINTMENT (OUTPATIENT)
Dept: CARDIOLOGY | Facility: CLINIC | Age: 79
End: 2019-12-30
Payer: MEDICARE

## 2019-12-30 ENCOUNTER — NON-APPOINTMENT (OUTPATIENT)
Age: 79
End: 2019-12-30

## 2019-12-30 VITALS
WEIGHT: 115 LBS | OXYGEN SATURATION: 97 % | HEIGHT: 58 IN | DIASTOLIC BLOOD PRESSURE: 87 MMHG | TEMPERATURE: 97.9 F | HEART RATE: 83 BPM | SYSTOLIC BLOOD PRESSURE: 132 MMHG | BODY MASS INDEX: 24.14 KG/M2

## 2019-12-30 PROCEDURE — 93000 ELECTROCARDIOGRAM COMPLETE: CPT

## 2019-12-30 PROCEDURE — 99215 OFFICE O/P EST HI 40 MIN: CPT

## 2019-12-30 NOTE — HISTORY OF PRESENT ILLNESS
[FreeTextEntry1] : She presents in scheduled followup accompanied by her daughter reporting that she has been feeling well.  ADL are well-tolerated and she walks modest distances fairly regularly without any effort provoke symptoms.  Depression is fairly well controlled.\par \par No dyspnea, orthopnea or PND. Edema has not recurred.\par No c/o chest, throat,jaw, arm or upper back discomfort.  No dizziness or syncope.   No  claudication.\par \par No anticoagulation related bleeding problems.\par \par Dr. Davis is following labs regularly

## 2019-12-30 NOTE — REASON FOR VISIT
[Anticoagulation] : anticoagulation [Dyspnea] : dyspnea [Atrial Fibrillation] : atrial fibrillation [Heart Failure] : congestive heart failure [Hyperlipidemia] : hyperlipidemia [Hypertension] : hypertension [Mitral Regurgitation] : mitral regurgitation [FreeTextEntry1] : \par

## 2020-01-17 LAB
INR PPP: 2.4
PROTHROMBIN TIME COMMENT: NORMAL

## 2020-02-09 LAB
INR PPP: 3.3
PROTHROMBIN TIME COMMENT: NORMAL

## 2020-02-14 LAB
INR PPP: 2.1
PROTHROMBIN TIME COMMENT: NORMAL

## 2020-02-27 LAB — INR PPP: 2.7

## 2020-03-05 LAB
INR PPP: 3.1
PROTHROMBIN TIME COMMENT: NORMAL

## 2020-03-25 LAB — INR PPP: 1.9

## 2020-04-20 LAB
INR PPP: 2
PROTHROMBIN TIME COMMENT: NORMAL

## 2020-04-23 LAB
INR PPP: 2.6
PROTHROMBIN TIME COMMENT: NORMAL

## 2020-05-01 LAB
INR PPP: 2.4
PROTHROMBIN TIME COMMENT: NORMAL

## 2020-05-07 LAB
INR PPP: 2.7
PROTHROMBIN TIME COMMENT: NORMAL

## 2020-05-12 LAB
INR PPP: 1.6
INR PPP: 1.7
INR PPP: 1.8
INR PPP: 1.9
INR PPP: 2
INR PPP: 2
INR PPP: 2.1 RATIO
INR PPP: 2.2
INR PPP: 2.3
INR PPP: 2.3
INR PPP: 2.5
INR PPP: 2.6
INR PPP: 2.8
INR PPP: 2.8
INR PPP: 2.9
INR PPP: 3
INR PPP: 3.1
INR PPP: 3.2 RATIO
INR PPP: 3.8
PROTHROMBIN TIME COMMENT: NORMAL

## 2020-05-15 LAB
INR PPP: 2.4
PROTHROMBIN TIME COMMENT: NORMAL

## 2020-05-22 LAB
INR PPP: 2.6
PROTHROMBIN TIME COMMENT: NORMAL

## 2020-05-29 LAB
INR PPP: 2.5
PROTHROMBIN TIME COMMENT: NORMAL

## 2020-06-04 LAB
INR PPP: 2.7
PROTHROMBIN TIME COMMENT: NORMAL

## 2020-06-10 ENCOUNTER — APPOINTMENT (OUTPATIENT)
Dept: CARDIOLOGY | Facility: CLINIC | Age: 80
End: 2020-06-10
Payer: MEDICARE

## 2020-06-10 ENCOUNTER — NON-APPOINTMENT (OUTPATIENT)
Age: 80
End: 2020-06-10

## 2020-06-10 VITALS
BODY MASS INDEX: 23.62 KG/M2 | SYSTOLIC BLOOD PRESSURE: 148 MMHG | HEART RATE: 88 BPM | DIASTOLIC BLOOD PRESSURE: 90 MMHG | OXYGEN SATURATION: 98 % | TEMPERATURE: 97.3 F | WEIGHT: 113 LBS

## 2020-06-10 VITALS — DIASTOLIC BLOOD PRESSURE: 82 MMHG | SYSTOLIC BLOOD PRESSURE: 116 MMHG

## 2020-06-10 PROCEDURE — 99214 OFFICE O/P EST MOD 30 MIN: CPT

## 2020-06-10 PROCEDURE — 93000 ELECTROCARDIOGRAM COMPLETE: CPT

## 2020-06-10 PROCEDURE — 93306 TTE W/DOPPLER COMPLETE: CPT

## 2020-06-10 NOTE — HISTORY OF PRESENT ILLNESS
[FreeTextEntry1] : She presents in scheduled followup accompanied by her husbandughter reporting that she has been feeling well with the exception of chronic fatigue..  ADL are well-tolerated and she walks modest distances f 4 days weekly without any effort provoked symptoms.  Depression is stable-"I wish I was better".  She is followed by Dr. Avila.\par \par No dyspnea, orthopnea or PND. Edema has not recurred.\par No c/o chest, throat,jaw, arm or upper back discomfort.  No dizziness or syncope.   No  claudication.\par \par No anticoagulation related bleeding problems.\par \par Dr. Davis is following labs regularly

## 2020-06-26 LAB
INR PPP: 1.7
PROTHROMBIN TIME COMMENT: NORMAL

## 2020-07-08 LAB
INR PPP: 1.8
PROTHROMBIN TIME COMMENT: NORMAL

## 2020-08-04 LAB
INR PPP: 2.6
PROTHROMBIN TIME COMMENT: NORMAL

## 2020-08-12 LAB
INR PPP: 1.6
INR PPP: 2.2
PROTHROMBIN TIME COMMENT: NORMAL
PROTHROMBIN TIME COMMENT: NORMAL

## 2020-08-14 LAB
INR PPP: 2.1
PROTHROMBIN TIME COMMENT: NORMAL

## 2020-08-26 ENCOUNTER — RX RENEWAL (OUTPATIENT)
Age: 80
End: 2020-08-26

## 2020-08-31 LAB
INR PPP: 2.1
PROTHROMBIN TIME COMMENT: NORMAL

## 2020-09-12 LAB
INR PPP: 1.8
INR PPP: 2.3
PROTHROMBIN TIME COMMENT: NORMAL
PROTHROMBIN TIME COMMENT: NORMAL

## 2020-09-21 LAB
INR PPP: 2.5
PROTHROMBIN TIME COMMENT: NORMAL

## 2020-09-22 ENCOUNTER — NON-APPOINTMENT (OUTPATIENT)
Age: 80
End: 2020-09-22

## 2020-09-22 ENCOUNTER — APPOINTMENT (OUTPATIENT)
Dept: CARDIOLOGY | Facility: CLINIC | Age: 80
End: 2020-09-22
Payer: MEDICARE

## 2020-09-22 VITALS
OXYGEN SATURATION: 96 % | DIASTOLIC BLOOD PRESSURE: 81 MMHG | TEMPERATURE: 97.2 F | HEIGHT: 58 IN | RESPIRATION RATE: 17 BRPM | SYSTOLIC BLOOD PRESSURE: 143 MMHG | BODY MASS INDEX: 23.3 KG/M2 | HEART RATE: 79 BPM | WEIGHT: 111 LBS

## 2020-09-22 PROCEDURE — 93000 ELECTROCARDIOGRAM COMPLETE: CPT

## 2020-09-22 PROCEDURE — 99214 OFFICE O/P EST MOD 30 MIN: CPT

## 2020-09-22 NOTE — HISTORY OF PRESENT ILLNESS
[FreeTextEntry1] : She presents in scheduled followup accompanied by her  reporting that she has been feeling well with the exception of chronic fatigue and depression.  ADL are well-tolerated and she walks modest distances f 4 days weekly without any effort provoked symptoms.  Depression is stable, but not ideally controlled,  Now on an anti-the medication.  She will call in the name.\par \par No dyspnea, orthopnea or PND. Edema has not recurred.\par No c/o chest, throat,jaw, arm or upper back discomfort.  Rare, random fleeting palpitations.  No dizziness or syncope.   No  claudication.\par \par No anticoagulation related bleeding problems other than a right shin abrasion after shaving her legs.\par \par Dr. Davis is following labs regularly

## 2020-10-09 LAB
INR PPP: 2.7
PROTHROMBIN TIME COMMENT: NORMAL

## 2020-10-26 LAB
INR PPP: 2.8
PROTHROMBIN TIME COMMENT: NORMAL

## 2020-10-28 LAB
INR PPP: 1.9
PROTHROMBIN TIME COMMENT: NORMAL

## 2020-11-09 LAB
INR PPP: 2.3
PROTHROMBIN TIME COMMENT: NORMAL

## 2020-11-23 LAB
INR PPP: 2.2
PROTHROMBIN TIME COMMENT: NORMAL

## 2020-12-03 LAB
INR PPP: 2.1
PROTHROMBIN TIME COMMENT: NORMAL

## 2020-12-10 LAB
INR PPP: 1.8
PROTHROMBIN TIME COMMENT: NORMAL

## 2020-12-23 LAB
INR PPP: 1.8
PROTHROMBIN TIME COMMENT: NORMAL

## 2020-12-30 LAB
INR PPP: 2.9
PROTHROMBIN TIME COMMENT: NORMAL

## 2021-01-06 LAB
INR PPP: 1.8
PROTHROMBIN TIME COMMENT: NORMAL

## 2021-01-13 LAB
INR PPP: 1.9
PROTHROMBIN TIME COMMENT: NORMAL

## 2021-01-20 LAB
INR PPP: 3.1
PROTHROMBIN TIME COMMENT: NORMAL

## 2021-02-03 ENCOUNTER — APPOINTMENT (OUTPATIENT)
Dept: CARDIOLOGY | Facility: CLINIC | Age: 81
End: 2021-02-03
Payer: MEDICARE

## 2021-02-03 ENCOUNTER — NON-APPOINTMENT (OUTPATIENT)
Age: 81
End: 2021-02-03

## 2021-02-03 VITALS
SYSTOLIC BLOOD PRESSURE: 146 MMHG | DIASTOLIC BLOOD PRESSURE: 84 MMHG | HEART RATE: 82 BPM | OXYGEN SATURATION: 96 % | TEMPERATURE: 97 F | BODY MASS INDEX: 24.04 KG/M2 | WEIGHT: 115 LBS

## 2021-02-03 PROCEDURE — 99214 OFFICE O/P EST MOD 30 MIN: CPT

## 2021-02-03 PROCEDURE — 85610 PROTHROMBIN TIME: CPT | Mod: QW

## 2021-02-03 PROCEDURE — 93000 ELECTROCARDIOGRAM COMPLETE: CPT

## 2021-02-03 NOTE — HISTORY OF PRESENT ILLNESS
[FreeTextEntry1] : She presents in scheduled followup accompanied by her  reporting that she has been feeling well with the exception of chronic fatigue and depression.  ADL are well-tolerated and she walks to 1 mile and exercises on a CUBII without any effort provoked symptoms.  Depression is stable, but not ideally controlled,  \par \par No dyspnea, orthopnea or PND. Edema has not recurred.\par No c/o chest, throat,jaw, arm or upper back discomfort.  Rare, random fleeting palpitations.  No dizziness or syncope.   No  claudication.\par \par No anticoagulation related bleeding problems other than a right shin abrasion after shaving her legs.\par \par Dr. Davis is following labs regularly

## 2021-02-05 LAB — INR PPP: 2.9 RATIO

## 2021-02-11 LAB
INR PPP: 2.4
PROTHROMBIN TIME COMMENT: NORMAL

## 2021-03-18 LAB
INR PPP: 2.3
INR PPP: 2.3
PROTHROMBIN TIME COMMENT: NORMAL
PROTHROMBIN TIME COMMENT: NORMAL

## 2021-03-21 LAB
INR PPP: 2.7
PROTHROMBIN TIME COMMENT: NORMAL

## 2021-03-31 LAB
INR PPP: 2.4
PROTHROMBIN TIME COMMENT: NORMAL

## 2021-04-30 LAB
INR PPP: 2
PROTHROMBIN TIME COMMENT: NORMAL

## 2021-05-31 LAB
INR PPP: 1.9
PROTHROMBIN TIME COMMENT: NORMAL

## 2021-06-01 ENCOUNTER — NON-APPOINTMENT (OUTPATIENT)
Age: 81
End: 2021-06-01

## 2021-06-01 ENCOUNTER — APPOINTMENT (OUTPATIENT)
Dept: CARDIOLOGY | Facility: CLINIC | Age: 81
End: 2021-06-01
Payer: MEDICARE

## 2021-06-01 VITALS
BODY MASS INDEX: 23.83 KG/M2 | SYSTOLIC BLOOD PRESSURE: 120 MMHG | HEART RATE: 98 BPM | WEIGHT: 114 LBS | OXYGEN SATURATION: 97 % | DIASTOLIC BLOOD PRESSURE: 88 MMHG | TEMPERATURE: 97 F

## 2021-06-01 VITALS — SYSTOLIC BLOOD PRESSURE: 124 MMHG | DIASTOLIC BLOOD PRESSURE: 78 MMHG

## 2021-06-01 PROCEDURE — 99214 OFFICE O/P EST MOD 30 MIN: CPT

## 2021-06-01 PROCEDURE — 93000 ELECTROCARDIOGRAM COMPLETE: CPT

## 2021-06-01 NOTE — HISTORY OF PRESENT ILLNESS
[FreeTextEntry1] : She presents in scheduled followup accompanied by her  reporting that she has been feeling well with the exception of unchanged chronic fatigue and depression.  ADL are well-tolerated and she walks in her apartment without any effort provoked symptoms.    \par \par No dyspnea, orthopnea or PND. Edema has not recurred.\par No c/o chest, throat,jaw, arm or upper back discomfort.  Rare, random fleeting palpitations.  No dizziness set with rising quickly.  No syncope.   No  claudication.\par \par No anticoagulation related bleeding problems.\par \par Dr. Davis is following labs regularly.\par \par Following up with her psychiatrist regularly.

## 2021-06-01 NOTE — REASON FOR VISIT
[Anticoagulation] : anticoagulation [Atrial Fibrillation] : atrial fibrillation [Dyspnea] : dyspnea [Heart Failure] : congestive heart failure [Hyperlipidemia] : hyperlipidemia [Hypertension] : hypertension [Mitral Regurgitation] : mitral regurgitation [FreeTextEntry1] : Tricuspid Regurgitation

## 2021-06-07 ENCOUNTER — RX RENEWAL (OUTPATIENT)
Age: 81
End: 2021-06-07

## 2021-06-09 LAB
INR PPP: 2.5
PROTHROMBIN TIME COMMENT: NORMAL

## 2021-06-14 LAB
INR PPP: 3.1
PROTHROMBIN TIME COMMENT: NORMAL

## 2021-06-17 LAB
INR PPP: 1.7
PROTHROMBIN TIME COMMENT: NORMAL

## 2021-06-27 LAB
INR PPP: 2
PROTHROMBIN TIME COMMENT: NORMAL

## 2021-07-09 LAB
INR PPP: 2.3
PROTHROMBIN TIME COMMENT: NORMAL

## 2021-07-25 LAB
INR PPP: 2.7
PROTHROMBIN TIME COMMENT: NORMAL

## 2021-07-31 LAB
INR PPP: 2.6
PROTHROMBIN TIME COMMENT: NORMAL

## 2021-08-06 LAB
INR PPP: 3
PROTHROMBIN TIME COMMENT: NORMAL

## 2021-08-13 ENCOUNTER — NON-APPOINTMENT (OUTPATIENT)
Age: 81
End: 2021-08-13

## 2021-08-19 LAB
INR PPP: 2.6
INR PPP: 2.9
PROTHROMBIN TIME COMMENT: NORMAL
PROTHROMBIN TIME COMMENT: NORMAL

## 2021-08-29 LAB
INR PPP: 1.5
PROTHROMBIN TIME COMMENT: NORMAL

## 2021-09-01 LAB
INR PPP: 2
PROTHROMBIN TIME COMMENT: NORMAL

## 2021-09-13 LAB
INR PPP: 2.4
PROTHROMBIN TIME COMMENT: NORMAL

## 2021-09-20 LAB
INR PPP: 2.1
PROTHROMBIN TIME COMMENT: NORMAL

## 2021-09-25 LAB
INR PPP: 1.9
PROTHROMBIN TIME COMMENT: NORMAL

## 2021-10-11 ENCOUNTER — APPOINTMENT (OUTPATIENT)
Dept: CARDIOLOGY | Facility: CLINIC | Age: 81
End: 2021-10-11
Payer: MEDICARE

## 2021-10-11 VITALS
BODY MASS INDEX: 22.36 KG/M2 | OXYGEN SATURATION: 96 % | WEIGHT: 107 LBS | SYSTOLIC BLOOD PRESSURE: 120 MMHG | DIASTOLIC BLOOD PRESSURE: 84 MMHG | HEART RATE: 99 BPM

## 2021-10-11 DIAGNOSIS — Z23 ENCOUNTER FOR IMMUNIZATION: ICD-10-CM

## 2021-10-11 PROCEDURE — G0008: CPT

## 2021-10-11 PROCEDURE — 93306 TTE W/DOPPLER COMPLETE: CPT

## 2021-10-11 PROCEDURE — 93000 ELECTROCARDIOGRAM COMPLETE: CPT

## 2021-10-11 PROCEDURE — 99214 OFFICE O/P EST MOD 30 MIN: CPT

## 2021-10-11 PROCEDURE — 90662 IIV NO PRSV INCREASED AG IM: CPT

## 2021-10-11 NOTE — HISTORY OF PRESENT ILLNESS
[FreeTextEntry1] : She presents in scheduled followup accompanied by her daughter reporting that she has been feeling well with the exception of unchanged chronic fatigue, anxiety and depression.  And her IBS symptoms.  ADL are well-tolerated and she walks in her apartment without any effort provoked symptoms.    \par \par 2 weeks ago, in the setting of diminished appetite and "dehydration" she turned in the bathroom and fell striking her left hip.  No head trauma.  Ambulated thereafter without difficulty\par No dyspnea, orthopnea or PND. Edema has not recurred.\par No c/o chest, throat,jaw, arm or upper back discomfort.  Rare, random fleeting palpitations occur in the setting of her anxiety..  No dizziness set with rising quickly.  No syncope.   No  claudication.\par \par No anticoagulation related bleeding problems.\par \par Dr. Davis is following labs regularly.\par \par Following up with her psychiatrist regularly.

## 2021-10-12 LAB
INR PPP: 2.1
PROTHROMBIN TIME COMMENT: NORMAL

## 2021-11-02 LAB
INR PPP: 2.1
PROTHROMBIN TIME COMMENT: NORMAL

## 2021-11-18 LAB
INR PPP: 2.3
PROTHROMBIN TIME COMMENT: NORMAL

## 2021-12-01 ENCOUNTER — APPOINTMENT (OUTPATIENT)
Dept: CARDIOLOGY | Facility: CLINIC | Age: 81
End: 2021-12-01
Payer: MEDICARE

## 2021-12-01 ENCOUNTER — NON-APPOINTMENT (OUTPATIENT)
Age: 81
End: 2021-12-01

## 2021-12-01 VITALS
DIASTOLIC BLOOD PRESSURE: 78 MMHG | SYSTOLIC BLOOD PRESSURE: 126 MMHG | WEIGHT: 115 LBS | OXYGEN SATURATION: 97 % | BODY MASS INDEX: 24.14 KG/M2 | HEART RATE: 93 BPM | HEIGHT: 58 IN

## 2021-12-01 DIAGNOSIS — R06.02 SHORTNESS OF BREATH: ICD-10-CM

## 2021-12-01 LAB
ALBUMIN SERPL ELPH-MCNC: 4.5 G/DL
ALP BLD-CCNC: 163 U/L
ALT SERPL-CCNC: 37 U/L
ANION GAP SERPL CALC-SCNC: 13 MMOL/L
AST SERPL-CCNC: 29 U/L
BASOPHILS # BLD AUTO: 0.04 K/UL
BASOPHILS NFR BLD AUTO: 0.7 %
BILIRUB SERPL-MCNC: 0.3 MG/DL
BUN SERPL-MCNC: 15 MG/DL
CALCIUM SERPL-MCNC: 10.2 MG/DL
CHLORIDE SERPL-SCNC: 105 MMOL/L
CO2 SERPL-SCNC: 25 MMOL/L
CREAT SERPL-MCNC: 0.87 MG/DL
EOSINOPHIL # BLD AUTO: 0.07 K/UL
EOSINOPHIL NFR BLD AUTO: 1.1 %
ESTIMATED AVERAGE GLUCOSE: 120 MG/DL
GLUCOSE SERPL-MCNC: 99 MG/DL
HBA1C MFR BLD HPLC: 5.8 %
HCT VFR BLD CALC: 38.9 %
HGB BLD-MCNC: 12.4 G/DL
IMM GRANULOCYTES NFR BLD AUTO: 0.2 %
LYMPHOCYTES # BLD AUTO: 1.4 K/UL
LYMPHOCYTES NFR BLD AUTO: 22.8 %
MAN DIFF?: NORMAL
MCHC RBC-ENTMCNC: 31.9 GM/DL
MCHC RBC-ENTMCNC: 33.2 PG
MCV RBC AUTO: 104 FL
MONOCYTES # BLD AUTO: 0.47 K/UL
MONOCYTES NFR BLD AUTO: 7.7 %
NEUTROPHILS # BLD AUTO: 4.15 K/UL
NEUTROPHILS NFR BLD AUTO: 67.5 %
NT-PROBNP SERPL-MCNC: 1884 PG/ML
PLATELET # BLD AUTO: 183 K/UL
POTASSIUM SERPL-SCNC: 4.3 MMOL/L
PROT SERPL-MCNC: 7 G/DL
RBC # BLD: 3.74 M/UL
RBC # FLD: 14.9 %
SODIUM SERPL-SCNC: 143 MMOL/L
TSH SERPL-ACNC: 2.25 UIU/ML
WBC # FLD AUTO: 6.14 K/UL

## 2021-12-01 PROCEDURE — 99214 OFFICE O/P EST MOD 30 MIN: CPT

## 2021-12-01 PROCEDURE — 36415 COLL VENOUS BLD VENIPUNCTURE: CPT

## 2021-12-01 PROCEDURE — 93000 ELECTROCARDIOGRAM COMPLETE: CPT

## 2021-12-01 NOTE — HISTORY OF PRESENT ILLNESS
[FreeTextEntry1] : She presents in scheduled followup accompanied by her  reporting that her appetite is improved and she has gained some weight.  However, especially following Thanksgiving, she notes increasing edema and, in recent weeks breathlessness and chest pounding with modest walking.  No associated chest, throat or jaw discomfort.\par \par Elevates her legs in bed but not periodically during the day.\par \par No additional falls.\par \par No orthopnea or PND.\par \par

## 2021-12-07 LAB
INR PPP: 2.5
PROTHROMBIN TIME COMMENT: NORMAL

## 2021-12-13 LAB
INR PPP: 2.1
PROTHROMBIN TIME COMMENT: NORMAL

## 2021-12-22 ENCOUNTER — NON-APPOINTMENT (OUTPATIENT)
Age: 81
End: 2021-12-22

## 2021-12-22 LAB
INR PPP: 2.3
PROTHROMBIN TIME COMMENT: NORMAL

## 2022-01-03 ENCOUNTER — NON-APPOINTMENT (OUTPATIENT)
Age: 82
End: 2022-01-03

## 2022-01-03 ENCOUNTER — APPOINTMENT (OUTPATIENT)
Dept: CARDIOLOGY | Facility: CLINIC | Age: 82
End: 2022-01-03
Payer: MEDICARE

## 2022-01-03 VITALS
OXYGEN SATURATION: 98 % | HEART RATE: 87 BPM | WEIGHT: 113 LBS | DIASTOLIC BLOOD PRESSURE: 72 MMHG | SYSTOLIC BLOOD PRESSURE: 118 MMHG | BODY MASS INDEX: 23.62 KG/M2

## 2022-01-03 PROCEDURE — 93000 ELECTROCARDIOGRAM COMPLETE: CPT

## 2022-01-03 PROCEDURE — 99214 OFFICE O/P EST MOD 30 MIN: CPT

## 2022-01-03 NOTE — HISTORY OF PRESENT ILLNESS
[FreeTextEntry1] : She presents in scheduled followup accompanied by her  reporting that she sometimes "aches all over" and more recently has been experiencing right knee and left ankle pain.  Her  plans to have her see a rheumatologist.  She is very frustrated that she has met with little success in treating her nausea and abdominal discomfort.  Breathing has been comfortable and edema minimal since increasing torsemide to 10 mg once daily.  Weight has been stable.  No orthopnea or PND..\par \par No palpitations, dizziness or syncope.  No chest, throat or jaw discomfort.\par \par No anticoagulation related bleeding problems.  Stool guaiac with Dr. Zimmer was negative.\par \par \par \par

## 2022-01-10 LAB
INR PPP: 3
PROTHROMBIN TIME COMMENT: NORMAL

## 2022-01-21 LAB
INR PPP: 4
PROTHROMBIN TIME COMMENT: NORMAL

## 2022-01-31 LAB
INR PPP: 2
PROTHROMBIN TIME COMMENT: NORMAL

## 2022-02-10 LAB
INR PPP: 3
PROTHROMBIN TIME COMMENT: NORMAL

## 2022-02-15 ENCOUNTER — APPOINTMENT (OUTPATIENT)
Dept: CARDIOLOGY | Facility: CLINIC | Age: 82
End: 2022-02-15
Payer: MEDICARE

## 2022-02-15 ENCOUNTER — NON-APPOINTMENT (OUTPATIENT)
Age: 82
End: 2022-02-15

## 2022-02-15 VITALS
DIASTOLIC BLOOD PRESSURE: 74 MMHG | WEIGHT: 109 LBS | SYSTOLIC BLOOD PRESSURE: 124 MMHG | OXYGEN SATURATION: 98 % | HEART RATE: 83 BPM | BODY MASS INDEX: 22.78 KG/M2

## 2022-02-15 LAB — INR PPP: 3.3 RATIO

## 2022-02-15 PROCEDURE — 85610 PROTHROMBIN TIME: CPT | Mod: QW

## 2022-02-15 PROCEDURE — 93000 ELECTROCARDIOGRAM COMPLETE: CPT

## 2022-02-15 PROCEDURE — 99213 OFFICE O/P EST LOW 20 MIN: CPT

## 2022-02-15 NOTE — HISTORY OF PRESENT ILLNESS
[FreeTextEntry1] : She presents in scheduled followup accompanied by her daughter reporting that she has been concerned about losing weight.   She veins frustrated that she has met with little success in treating her nausea and abdominal discomfort.  Breathing has been comfortable and no edema.\par \par No palpitations, dizziness or syncope.  No chest, throat or jaw discomfort.\par \par No anticoagulation related bleeding problems. \par \par Joint/muscle symptoms have improved with acupuncture.\par \par \par \par

## 2022-02-24 LAB
INR PPP: 2.2
PROTHROMBIN TIME COMMENT: NORMAL

## 2022-03-02 LAB
INR PPP: 1.8 RATIO
PROTHROMBIN TIME COMMENT: NORMAL

## 2022-03-13 LAB
INR PPP: 2.4
PROTHROMBIN TIME COMMENT: NORMAL

## 2022-03-18 LAB
INR PPP: 2.4
PROTHROMBIN TIME COMMENT: NORMAL

## 2022-03-24 LAB
INR PPP: 3.4
PROTHROMBIN TIME COMMENT: NORMAL

## 2022-04-01 LAB
INR PPP: 2.7
PROTHROMBIN TIME COMMENT: NORMAL

## 2022-04-07 LAB
INR PPP: 2.8
PROTHROMBIN TIME COMMENT: NORMAL

## 2022-04-11 ENCOUNTER — APPOINTMENT (OUTPATIENT)
Dept: CARDIOLOGY | Facility: CLINIC | Age: 82
End: 2022-04-11

## 2022-04-18 LAB
INR PPP: 3.9
PROTHROMBIN TIME COMMENT: NORMAL

## 2022-04-29 LAB
INR PPP: 2.6
INR PPP: 3.3
PROTHROMBIN TIME COMMENT: NORMAL
PROTHROMBIN TIME COMMENT: NORMAL

## 2022-05-06 ENCOUNTER — NON-APPOINTMENT (OUTPATIENT)
Age: 82
End: 2022-05-06

## 2022-05-10 LAB
INR PPP: 2.1
PROTHROMBIN TIME COMMENT: NORMAL

## 2022-05-16 LAB
INR PPP: 2
PROTHROMBIN TIME COMMENT: NORMAL

## 2022-05-17 ENCOUNTER — APPOINTMENT (OUTPATIENT)
Dept: CARDIOLOGY | Facility: CLINIC | Age: 82
End: 2022-05-17
Payer: MEDICARE

## 2022-05-17 ENCOUNTER — NON-APPOINTMENT (OUTPATIENT)
Age: 82
End: 2022-05-17

## 2022-05-17 VITALS
DIASTOLIC BLOOD PRESSURE: 81 MMHG | SYSTOLIC BLOOD PRESSURE: 125 MMHG | WEIGHT: 111 LBS | HEART RATE: 80 BPM | HEIGHT: 58 IN | OXYGEN SATURATION: 95 % | BODY MASS INDEX: 23.3 KG/M2

## 2022-05-17 DIAGNOSIS — F32.A DEPRESSION, UNSPECIFIED: ICD-10-CM

## 2022-05-17 PROCEDURE — 85610 PROTHROMBIN TIME: CPT | Mod: QW

## 2022-05-17 PROCEDURE — 93000 ELECTROCARDIOGRAM COMPLETE: CPT

## 2022-05-17 PROCEDURE — 99214 OFFICE O/P EST MOD 30 MIN: CPT

## 2022-05-17 NOTE — HISTORY OF PRESENT ILLNESS
[FreeTextEntry1] : She presents in scheduled followup accompanied by her daughter reporting that she has been doing well with the exception of her IBS symptoms.  Actively followed by Dr. Zimmer who plans a noncontrast CAT scan in the near future.   She rates her usual low-level ADL without any effort provoke symptoms..\par \par No palpitations, dizziness or syncope.  No dyspnea, orthopnea or PND.  No chest, throat or jaw discomfort.\par \par No anticoagulation related bleeding problems. \par \par Tolerating physical therapy focused on gait training.\par \par \par \par \par

## 2022-05-18 LAB
INR PPP: 2.1 RATIO
POCT-PROTHROMBIN TIME: 25.2 SECS

## 2022-06-02 LAB
INR PPP: 2.1
PROTHROMBIN TIME COMMENT: NORMAL

## 2022-06-09 ENCOUNTER — RX RENEWAL (OUTPATIENT)
Age: 82
End: 2022-06-09

## 2022-06-09 ENCOUNTER — NON-APPOINTMENT (OUTPATIENT)
Age: 82
End: 2022-06-09

## 2022-06-10 LAB
INR PPP: 2.6
PROTHROMBIN TIME COMMENT: NORMAL

## 2022-06-16 ENCOUNTER — NON-APPOINTMENT (OUTPATIENT)
Age: 82
End: 2022-06-16

## 2022-06-21 LAB
INR PPP: 2.4
PROTHROMBIN TIME COMMENT: NORMAL

## 2022-06-28 LAB
INR PPP: 2.8
PROTHROMBIN TIME COMMENT: NORMAL

## 2022-07-07 LAB
INR PPP: 2.2
PROTHROMBIN TIME COMMENT: NORMAL

## 2022-07-21 LAB
INR PPP: 2.7
PROTHROMBIN TIME COMMENT: NORMAL

## 2022-07-28 LAB
INR PPP: 3.5
PROTHROMBIN TIME COMMENT: NORMAL

## 2022-07-31 ENCOUNTER — TRANSCRIPTION ENCOUNTER (OUTPATIENT)
Age: 82
End: 2022-07-31

## 2022-08-01 ENCOUNTER — INPATIENT (INPATIENT)
Facility: HOSPITAL | Age: 82
LOS: 4 days | Discharge: HOME | DRG: 522 | End: 2022-08-06
Attending: ORTHOPAEDIC SURGERY | Admitting: ORTHOPAEDIC SURGERY
Payer: MEDICARE

## 2022-08-01 ENCOUNTER — RESULT REVIEW (OUTPATIENT)
Age: 82
End: 2022-08-01

## 2022-08-01 VITALS
OXYGEN SATURATION: 97 % | SYSTOLIC BLOOD PRESSURE: 136 MMHG | HEART RATE: 102 BPM | DIASTOLIC BLOOD PRESSURE: 85 MMHG | RESPIRATION RATE: 18 BRPM | HEIGHT: 59 IN | TEMPERATURE: 98 F | WEIGHT: 110.01 LBS

## 2022-08-01 DIAGNOSIS — K58.9 IRRITABLE BOWEL SYNDROME WITHOUT DIARRHEA: ICD-10-CM

## 2022-08-01 DIAGNOSIS — S72.002A FRACTURE OF UNSPECIFIED PART OF NECK OF LEFT FEMUR, INITIAL ENCOUNTER FOR CLOSED FRACTURE: ICD-10-CM

## 2022-08-01 DIAGNOSIS — I10 ESSENTIAL (PRIMARY) HYPERTENSION: ICD-10-CM

## 2022-08-01 DIAGNOSIS — S72.009A FRACTURE OF UNSPECIFIED PART OF NECK OF UNSPECIFIED FEMUR, INITIAL ENCOUNTER FOR CLOSED FRACTURE: ICD-10-CM

## 2022-08-01 DIAGNOSIS — I48.20 CHRONIC ATRIAL FIBRILLATION, UNSPECIFIED: ICD-10-CM

## 2022-08-01 LAB
ALBUMIN SERPL ELPH-MCNC: 3.8 G/DL — SIGNIFICANT CHANGE UP (ref 3.3–5)
ALBUMIN SERPL ELPH-MCNC: 4.3 G/DL — SIGNIFICANT CHANGE UP (ref 3.3–5)
ALBUMIN SERPL ELPH-MCNC: 4.6 G/DL — SIGNIFICANT CHANGE UP (ref 3.3–5)
ALP SERPL-CCNC: 137 U/L — HIGH (ref 40–120)
ALP SERPL-CCNC: 138 U/L — HIGH (ref 40–120)
ALP SERPL-CCNC: 154 U/L — HIGH (ref 40–120)
ALT FLD-CCNC: 32 U/L — SIGNIFICANT CHANGE UP (ref 10–45)
ALT FLD-CCNC: 39 U/L — SIGNIFICANT CHANGE UP (ref 10–45)
ALT FLD-CCNC: 65 U/L — HIGH (ref 10–45)
ANION GAP SERPL CALC-SCNC: 10 MMOL/L — SIGNIFICANT CHANGE UP (ref 5–17)
ANION GAP SERPL CALC-SCNC: 13 MMOL/L — SIGNIFICANT CHANGE UP (ref 5–17)
ANION GAP SERPL CALC-SCNC: 5 MMOL/L — SIGNIFICANT CHANGE UP (ref 5–17)
APTT BLD: 28.7 SEC — SIGNIFICANT CHANGE UP (ref 27.5–35.5)
APTT BLD: 33.1 SEC — SIGNIFICANT CHANGE UP (ref 27.5–35.5)
AST SERPL-CCNC: 167 U/L — HIGH (ref 10–40)
AST SERPL-CCNC: 33 U/L — SIGNIFICANT CHANGE UP (ref 10–40)
AST SERPL-CCNC: 40 U/L — SIGNIFICANT CHANGE UP (ref 10–40)
BASE EXCESS BLDV CALC-SCNC: 0.7 MMOL/L — SIGNIFICANT CHANGE UP (ref -2–2)
BASOPHILS # BLD AUTO: 0.03 K/UL — SIGNIFICANT CHANGE UP (ref 0–0.2)
BASOPHILS # BLD AUTO: 0.05 K/UL — SIGNIFICANT CHANGE UP (ref 0–0.2)
BASOPHILS NFR BLD AUTO: 0.3 % — SIGNIFICANT CHANGE UP (ref 0–2)
BASOPHILS NFR BLD AUTO: 0.5 % — SIGNIFICANT CHANGE UP (ref 0–2)
BILIRUB SERPL-MCNC: 0.5 MG/DL — SIGNIFICANT CHANGE UP (ref 0.2–1.2)
BILIRUB SERPL-MCNC: 0.6 MG/DL — SIGNIFICANT CHANGE UP (ref 0.2–1.2)
BILIRUB SERPL-MCNC: 0.7 MG/DL — SIGNIFICANT CHANGE UP (ref 0.2–1.2)
BLD GP AB SCN SERPL QL: NEGATIVE — SIGNIFICANT CHANGE UP
BUN SERPL-MCNC: 11 MG/DL — SIGNIFICANT CHANGE UP (ref 7–23)
BUN SERPL-MCNC: 14 MG/DL — SIGNIFICANT CHANGE UP (ref 7–23)
BUN SERPL-MCNC: 15 MG/DL — SIGNIFICANT CHANGE UP (ref 7–23)
CA-I SERPL-SCNC: 1.17 MMOL/L — SIGNIFICANT CHANGE UP (ref 1.15–1.33)
CALCIUM SERPL-MCNC: 8.7 MG/DL — SIGNIFICANT CHANGE UP (ref 8.4–10.5)
CALCIUM SERPL-MCNC: 9.2 MG/DL — SIGNIFICANT CHANGE UP (ref 8.4–10.5)
CALCIUM SERPL-MCNC: 9.4 MG/DL — SIGNIFICANT CHANGE UP (ref 8.4–10.5)
CHLORIDE BLDV-SCNC: 106 MMOL/L — SIGNIFICANT CHANGE UP (ref 96–108)
CHLORIDE SERPL-SCNC: 103 MMOL/L — SIGNIFICANT CHANGE UP (ref 96–108)
CHLORIDE SERPL-SCNC: 106 MMOL/L — SIGNIFICANT CHANGE UP (ref 96–108)
CHLORIDE SERPL-SCNC: 99 MMOL/L — SIGNIFICANT CHANGE UP (ref 96–108)
CO2 BLDV-SCNC: 28 MMOL/L — HIGH (ref 22–26)
CO2 SERPL-SCNC: 24 MMOL/L — SIGNIFICANT CHANGE UP (ref 22–31)
CO2 SERPL-SCNC: 24 MMOL/L — SIGNIFICANT CHANGE UP (ref 22–31)
CO2 SERPL-SCNC: 25 MMOL/L — SIGNIFICANT CHANGE UP (ref 22–31)
CREAT SERPL-MCNC: 0.64 MG/DL — SIGNIFICANT CHANGE UP (ref 0.5–1.3)
CREAT SERPL-MCNC: 0.75 MG/DL — SIGNIFICANT CHANGE UP (ref 0.5–1.3)
CREAT SERPL-MCNC: 0.84 MG/DL — SIGNIFICANT CHANGE UP (ref 0.5–1.3)
EGFR: 68 ML/MIN/1.73M2 — SIGNIFICANT CHANGE UP
EGFR: 78 ML/MIN/1.73M2 — SIGNIFICANT CHANGE UP
EGFR: 87 ML/MIN/1.73M2 — SIGNIFICANT CHANGE UP
EOSINOPHIL # BLD AUTO: 0.08 K/UL — SIGNIFICANT CHANGE UP (ref 0–0.5)
EOSINOPHIL # BLD AUTO: 0.11 K/UL — SIGNIFICANT CHANGE UP (ref 0–0.5)
EOSINOPHIL NFR BLD AUTO: 0.8 % — SIGNIFICANT CHANGE UP (ref 0–6)
EOSINOPHIL NFR BLD AUTO: 1.1 % — SIGNIFICANT CHANGE UP (ref 0–6)
GAS PNL BLDV: 139 MMOL/L — SIGNIFICANT CHANGE UP (ref 136–145)
GAS PNL BLDV: SIGNIFICANT CHANGE UP
GAS PNL BLDV: SIGNIFICANT CHANGE UP
GLUCOSE BLDV-MCNC: 124 MG/DL — HIGH (ref 70–99)
GLUCOSE SERPL-MCNC: 129 MG/DL — HIGH (ref 70–99)
GLUCOSE SERPL-MCNC: 136 MG/DL — HIGH (ref 70–99)
GLUCOSE SERPL-MCNC: 138 MG/DL — HIGH (ref 70–99)
HCO3 BLDV-SCNC: 27 MMOL/L — SIGNIFICANT CHANGE UP (ref 22–29)
HCT VFR BLD CALC: 37.2 % — SIGNIFICANT CHANGE UP (ref 34.5–45)
HCT VFR BLD CALC: 38.1 % — SIGNIFICANT CHANGE UP (ref 34.5–45)
HCT VFR BLD CALC: 39.9 % — SIGNIFICANT CHANGE UP (ref 34.5–45)
HCT VFR BLDA CALC: 37 % — SIGNIFICANT CHANGE UP (ref 34.5–46.5)
HGB BLD CALC-MCNC: 12.2 G/DL — SIGNIFICANT CHANGE UP (ref 11.7–16.1)
HGB BLD-MCNC: 12.4 G/DL — SIGNIFICANT CHANGE UP (ref 11.5–15.5)
HGB BLD-MCNC: 12.9 G/DL — SIGNIFICANT CHANGE UP (ref 11.5–15.5)
HGB BLD-MCNC: 13.1 G/DL — SIGNIFICANT CHANGE UP (ref 11.5–15.5)
IMM GRANULOCYTES NFR BLD AUTO: 0.4 % — SIGNIFICANT CHANGE UP (ref 0–1.5)
IMM GRANULOCYTES NFR BLD AUTO: 0.4 % — SIGNIFICANT CHANGE UP (ref 0–1.5)
INR BLD: 1.86 RATIO — HIGH (ref 0.88–1.16)
INR BLD: 2.6 RATIO — HIGH (ref 0.88–1.16)
INR BLD: 3.08 RATIO — HIGH (ref 0.88–1.16)
LACTATE BLDV-MCNC: 1.5 MMOL/L — SIGNIFICANT CHANGE UP (ref 0.7–2)
LYMPHOCYTES # BLD AUTO: 1.14 K/UL — SIGNIFICANT CHANGE UP (ref 1–3.3)
LYMPHOCYTES # BLD AUTO: 1.49 K/UL — SIGNIFICANT CHANGE UP (ref 1–3.3)
LYMPHOCYTES # BLD AUTO: 11.5 % — LOW (ref 13–44)
LYMPHOCYTES # BLD AUTO: 15.3 % — SIGNIFICANT CHANGE UP (ref 13–44)
MCHC RBC-ENTMCNC: 32.8 GM/DL — SIGNIFICANT CHANGE UP (ref 32–36)
MCHC RBC-ENTMCNC: 33.2 PG — SIGNIFICANT CHANGE UP (ref 27–34)
MCHC RBC-ENTMCNC: 33.3 GM/DL — SIGNIFICANT CHANGE UP (ref 32–36)
MCHC RBC-ENTMCNC: 33.8 PG — SIGNIFICANT CHANGE UP (ref 27–34)
MCHC RBC-ENTMCNC: 33.9 GM/DL — SIGNIFICANT CHANGE UP (ref 32–36)
MCHC RBC-ENTMCNC: 33.9 PG — SIGNIFICANT CHANGE UP (ref 27–34)
MCV RBC AUTO: 101.3 FL — HIGH (ref 80–100)
MCV RBC AUTO: 101.6 FL — HIGH (ref 80–100)
MCV RBC AUTO: 99.7 FL — SIGNIFICANT CHANGE UP (ref 80–100)
MONOCYTES # BLD AUTO: 0.53 K/UL — SIGNIFICANT CHANGE UP (ref 0–0.9)
MONOCYTES # BLD AUTO: 0.58 K/UL — SIGNIFICANT CHANGE UP (ref 0–0.9)
MONOCYTES NFR BLD AUTO: 5.5 % — SIGNIFICANT CHANGE UP (ref 2–14)
MONOCYTES NFR BLD AUTO: 5.9 % — SIGNIFICANT CHANGE UP (ref 2–14)
NEUTROPHILS # BLD AUTO: 7.49 K/UL — HIGH (ref 1.8–7.4)
NEUTROPHILS # BLD AUTO: 8.02 K/UL — HIGH (ref 1.8–7.4)
NEUTROPHILS NFR BLD AUTO: 77.2 % — HIGH (ref 43–77)
NEUTROPHILS NFR BLD AUTO: 81.1 % — HIGH (ref 43–77)
NRBC # BLD: 0 /100 WBCS — SIGNIFICANT CHANGE UP (ref 0–0)
PCO2 BLDV: 47 MMHG — HIGH (ref 39–42)
PH BLDV: 7.36 — SIGNIFICANT CHANGE UP (ref 7.32–7.43)
PLATELET # BLD AUTO: 128 K/UL — LOW (ref 150–400)
PLATELET # BLD AUTO: 129 K/UL — LOW (ref 150–400)
PLATELET # BLD AUTO: 132 K/UL — LOW (ref 150–400)
PO2 BLDV: 51 MMHG — HIGH (ref 25–45)
POTASSIUM BLDV-SCNC: 3.9 MMOL/L — SIGNIFICANT CHANGE UP (ref 3.5–5.1)
POTASSIUM SERPL-MCNC: 3.7 MMOL/L — SIGNIFICANT CHANGE UP (ref 3.5–5.3)
POTASSIUM SERPL-MCNC: 3.9 MMOL/L — SIGNIFICANT CHANGE UP (ref 3.5–5.3)
POTASSIUM SERPL-MCNC: >9 MMOL/L — CRITICAL HIGH (ref 3.5–5.3)
POTASSIUM SERPL-SCNC: 3.7 MMOL/L — SIGNIFICANT CHANGE UP (ref 3.5–5.3)
POTASSIUM SERPL-SCNC: 3.9 MMOL/L — SIGNIFICANT CHANGE UP (ref 3.5–5.3)
POTASSIUM SERPL-SCNC: >9 MMOL/L — CRITICAL HIGH (ref 3.5–5.3)
PROT SERPL-MCNC: 6.7 G/DL — SIGNIFICANT CHANGE UP (ref 6–8.3)
PROT SERPL-MCNC: 7.1 G/DL — SIGNIFICANT CHANGE UP (ref 6–8.3)
PROT SERPL-MCNC: 9 G/DL — HIGH (ref 6–8.3)
PROTHROM AB SERPL-ACNC: 21.7 SEC — HIGH (ref 10.5–13.4)
PROTHROM AB SERPL-ACNC: 30.5 SEC — HIGH (ref 10.5–13.4)
PROTHROM AB SERPL-ACNC: 36.1 SEC — HIGH (ref 10.5–13.4)
RBC # BLD: 3.66 M/UL — LOW (ref 3.8–5.2)
RBC # BLD: 3.82 M/UL — SIGNIFICANT CHANGE UP (ref 3.8–5.2)
RBC # BLD: 3.94 M/UL — SIGNIFICANT CHANGE UP (ref 3.8–5.2)
RBC # FLD: 13.4 % — SIGNIFICANT CHANGE UP (ref 10.3–14.5)
RBC # FLD: 13.4 % — SIGNIFICANT CHANGE UP (ref 10.3–14.5)
RBC # FLD: 13.5 % — SIGNIFICANT CHANGE UP (ref 10.3–14.5)
RH IG SCN BLD-IMP: POSITIVE — SIGNIFICANT CHANGE UP
SAO2 % BLDV: 81.1 % — SIGNIFICANT CHANGE UP (ref 67–88)
SARS-COV-2 RNA SPEC QL NAA+PROBE: SIGNIFICANT CHANGE UP
SARS-COV-2 RNA SPEC QL NAA+PROBE: SIGNIFICANT CHANGE UP
SODIUM SERPL-SCNC: 129 MMOL/L — LOW (ref 135–145)
SODIUM SERPL-SCNC: 140 MMOL/L — SIGNIFICANT CHANGE UP (ref 135–145)
SODIUM SERPL-SCNC: 140 MMOL/L — SIGNIFICANT CHANGE UP (ref 135–145)
WBC # BLD: 10.59 K/UL — HIGH (ref 3.8–10.5)
WBC # BLD: 9.71 K/UL — SIGNIFICANT CHANGE UP (ref 3.8–10.5)
WBC # BLD: 9.89 K/UL — SIGNIFICANT CHANGE UP (ref 3.8–10.5)
WBC # FLD AUTO: 10.59 K/UL — HIGH (ref 3.8–10.5)
WBC # FLD AUTO: 9.71 K/UL — SIGNIFICANT CHANGE UP (ref 3.8–10.5)
WBC # FLD AUTO: 9.89 K/UL — SIGNIFICANT CHANGE UP (ref 3.8–10.5)

## 2022-08-01 PROCEDURE — 73560 X-RAY EXAM OF KNEE 1 OR 2: CPT | Mod: 26,LT

## 2022-08-01 PROCEDURE — 71045 X-RAY EXAM CHEST 1 VIEW: CPT | Mod: 26

## 2022-08-01 PROCEDURE — 73552 X-RAY EXAM OF FEMUR 2/>: CPT | Mod: 26,LT

## 2022-08-01 PROCEDURE — 99285 EMERGENCY DEPT VISIT HI MDM: CPT

## 2022-08-01 PROCEDURE — 27236 TREAT THIGH FRACTURE: CPT | Mod: LT

## 2022-08-01 PROCEDURE — 93010 ELECTROCARDIOGRAM REPORT: CPT

## 2022-08-01 PROCEDURE — 72170 X-RAY EXAM OF PELVIS: CPT | Mod: 26,76

## 2022-08-01 PROCEDURE — 73502 X-RAY EXAM HIP UNI 2-3 VIEWS: CPT | Mod: 26,LT

## 2022-08-01 PROCEDURE — 72170 X-RAY EXAM OF PELVIS: CPT | Mod: 26

## 2022-08-01 PROCEDURE — 70450 CT HEAD/BRAIN W/O DYE: CPT | Mod: 26,MA

## 2022-08-01 DEVICE — HEAD FEM LFIT V40 26MM -3MM NK: Type: IMPLANTABLE DEVICE | Site: LEFT | Status: FUNCTIONAL

## 2022-08-01 DEVICE — STEM HIP 132 DEG NECK ANGL ACCOLADE II SZ4: Type: IMPLANTABLE DEVICE | Site: LEFT | Status: FUNCTIONAL

## 2022-08-01 DEVICE — HEAD FEM BI-P 26MM 43MM: Type: IMPLANTABLE DEVICE | Site: LEFT | Status: FUNCTIONAL

## 2022-08-01 RX ORDER — HYDROMORPHONE HYDROCHLORIDE 2 MG/ML
0.2 INJECTION INTRAMUSCULAR; INTRAVENOUS; SUBCUTANEOUS
Refills: 0 | Status: DISCONTINUED | OUTPATIENT
Start: 2022-08-01 | End: 2022-08-01

## 2022-08-01 RX ORDER — CLONAZEPAM 1 MG
0.5 TABLET ORAL AT BEDTIME
Refills: 0 | Status: DISCONTINUED | OUTPATIENT
Start: 2022-08-01 | End: 2022-08-06

## 2022-08-01 RX ORDER — SENNA PLUS 8.6 MG/1
2 TABLET ORAL AT BEDTIME
Refills: 0 | Status: DISCONTINUED | OUTPATIENT
Start: 2022-08-01 | End: 2022-08-01

## 2022-08-01 RX ORDER — SENNA PLUS 8.6 MG/1
2 TABLET ORAL AT BEDTIME
Refills: 0 | Status: DISCONTINUED | OUTPATIENT
Start: 2022-08-01 | End: 2022-08-06

## 2022-08-01 RX ORDER — PROTHROMBIN COMPLEX CONCENTRATE (HUMAN) 25.5; 16.5; 24; 22; 22; 26 [IU]/ML; [IU]/ML; [IU]/ML; [IU]/ML; [IU]/ML; [IU]/ML
1500 POWDER, FOR SOLUTION INTRAVENOUS ONCE
Refills: 0 | Status: COMPLETED | OUTPATIENT
Start: 2022-08-01 | End: 2022-08-01

## 2022-08-01 RX ORDER — LANOLIN ALCOHOL/MO/W.PET/CERES
3 CREAM (GRAM) TOPICAL AT BEDTIME
Refills: 0 | Status: DISCONTINUED | OUTPATIENT
Start: 2022-08-01 | End: 2022-08-01

## 2022-08-01 RX ORDER — OXYCODONE HYDROCHLORIDE 5 MG/1
2.5 TABLET ORAL EVERY 4 HOURS
Refills: 0 | Status: DISCONTINUED | OUTPATIENT
Start: 2022-08-01 | End: 2022-08-01

## 2022-08-01 RX ORDER — ACETAMINOPHEN 500 MG
1000 TABLET ORAL ONCE
Refills: 0 | Status: COMPLETED | OUTPATIENT
Start: 2022-08-01 | End: 2022-08-01

## 2022-08-01 RX ORDER — ONDANSETRON 8 MG/1
4 TABLET, FILM COATED ORAL ONCE
Refills: 0 | Status: DISCONTINUED | OUTPATIENT
Start: 2022-08-01 | End: 2022-08-01

## 2022-08-01 RX ORDER — SODIUM CHLORIDE 9 MG/ML
1000 INJECTION INTRAMUSCULAR; INTRAVENOUS; SUBCUTANEOUS
Refills: 0 | Status: DISCONTINUED | OUTPATIENT
Start: 2022-08-01 | End: 2022-08-02

## 2022-08-01 RX ORDER — ACETAMINOPHEN 500 MG
975 TABLET ORAL EVERY 8 HOURS
Refills: 0 | Status: DISCONTINUED | OUTPATIENT
Start: 2022-08-01 | End: 2022-08-01

## 2022-08-01 RX ORDER — OXYCODONE HYDROCHLORIDE 5 MG/1
2.5 TABLET ORAL EVERY 4 HOURS
Refills: 0 | Status: DISCONTINUED | OUTPATIENT
Start: 2022-08-01 | End: 2022-08-06

## 2022-08-01 RX ORDER — HYDROMORPHONE HYDROCHLORIDE 2 MG/ML
0.5 INJECTION INTRAMUSCULAR; INTRAVENOUS; SUBCUTANEOUS EVERY 6 HOURS
Refills: 0 | Status: DISCONTINUED | OUTPATIENT
Start: 2022-08-01 | End: 2022-08-06

## 2022-08-01 RX ORDER — ATENOLOL 25 MG/1
50 TABLET ORAL DAILY
Refills: 0 | Status: DISCONTINUED | OUTPATIENT
Start: 2022-08-01 | End: 2022-08-06

## 2022-08-01 RX ORDER — QUETIAPINE FUMARATE 200 MG/1
150 TABLET, FILM COATED ORAL DAILY
Refills: 0 | Status: DISCONTINUED | OUTPATIENT
Start: 2022-08-01 | End: 2022-08-06

## 2022-08-01 RX ORDER — HYDROMORPHONE HYDROCHLORIDE 2 MG/ML
0.5 INJECTION INTRAMUSCULAR; INTRAVENOUS; SUBCUTANEOUS EVERY 6 HOURS
Refills: 0 | Status: DISCONTINUED | OUTPATIENT
Start: 2022-08-01 | End: 2022-08-01

## 2022-08-01 RX ORDER — TRAMADOL HYDROCHLORIDE 50 MG/1
25 TABLET ORAL EVERY 8 HOURS
Refills: 0 | Status: DISCONTINUED | OUTPATIENT
Start: 2022-08-01 | End: 2022-08-06

## 2022-08-01 RX ORDER — CLONAZEPAM 1 MG
0.25 TABLET ORAL
Refills: 0 | Status: DISCONTINUED | OUTPATIENT
Start: 2022-08-01 | End: 2022-08-02

## 2022-08-01 RX ORDER — SIMVASTATIN 20 MG/1
10 TABLET, FILM COATED ORAL AT BEDTIME
Refills: 0 | Status: DISCONTINUED | OUTPATIENT
Start: 2022-08-01 | End: 2022-08-06

## 2022-08-01 RX ORDER — OXYCODONE HYDROCHLORIDE 5 MG/1
5 TABLET ORAL EVERY 4 HOURS
Refills: 0 | Status: DISCONTINUED | OUTPATIENT
Start: 2022-08-01 | End: 2022-08-01

## 2022-08-01 RX ORDER — OXYCODONE HYDROCHLORIDE 5 MG/1
5 TABLET ORAL EVERY 4 HOURS
Refills: 0 | Status: DISCONTINUED | OUTPATIENT
Start: 2022-08-01 | End: 2022-08-06

## 2022-08-01 RX ORDER — MAGNESIUM HYDROXIDE 400 MG/1
30 TABLET, CHEWABLE ORAL DAILY
Refills: 0 | Status: DISCONTINUED | OUTPATIENT
Start: 2022-08-01 | End: 2022-08-06

## 2022-08-01 RX ORDER — LANOLIN ALCOHOL/MO/W.PET/CERES
3 CREAM (GRAM) TOPICAL AT BEDTIME
Refills: 0 | Status: DISCONTINUED | OUTPATIENT
Start: 2022-08-01 | End: 2022-08-06

## 2022-08-01 RX ORDER — CEFAZOLIN SODIUM 1 G
2000 VIAL (EA) INJECTION EVERY 8 HOURS
Refills: 0 | Status: COMPLETED | OUTPATIENT
Start: 2022-08-02 | End: 2022-08-02

## 2022-08-01 RX ORDER — ACETAMINOPHEN 500 MG
650 TABLET ORAL ONCE
Refills: 0 | Status: DISCONTINUED | OUTPATIENT
Start: 2022-08-01 | End: 2022-08-01

## 2022-08-01 RX ORDER — BUPROPION HYDROCHLORIDE 150 MG/1
300 TABLET, EXTENDED RELEASE ORAL DAILY
Refills: 0 | Status: DISCONTINUED | OUTPATIENT
Start: 2022-08-01 | End: 2022-08-06

## 2022-08-01 RX ORDER — QUETIAPINE FUMARATE 200 MG/1
50 TABLET, FILM COATED ORAL AT BEDTIME
Refills: 0 | Status: DISCONTINUED | OUTPATIENT
Start: 2022-08-02 | End: 2022-08-02

## 2022-08-01 RX ORDER — MAGNESIUM HYDROXIDE 400 MG/1
30 TABLET, CHEWABLE ORAL DAILY
Refills: 0 | Status: DISCONTINUED | OUTPATIENT
Start: 2022-08-01 | End: 2022-08-01

## 2022-08-01 RX ORDER — ACETAMINOPHEN 500 MG
975 TABLET ORAL EVERY 8 HOURS
Refills: 0 | Status: DISCONTINUED | OUTPATIENT
Start: 2022-08-01 | End: 2022-08-06

## 2022-08-01 RX ADMIN — SENNA PLUS 2 TABLET(S): 8.6 TABLET ORAL at 22:35

## 2022-08-01 RX ADMIN — Medication 1000 MILLIGRAM(S): at 14:48

## 2022-08-01 RX ADMIN — SODIUM CHLORIDE 100 MILLILITER(S): 9 INJECTION INTRAMUSCULAR; INTRAVENOUS; SUBCUTANEOUS at 14:51

## 2022-08-01 RX ADMIN — Medication 400 MILLIGRAM(S): at 13:17

## 2022-08-01 RX ADMIN — PROTHROMBIN COMPLEX CONCENTRATE (HUMAN) 400 INTERNATIONAL UNIT(S): 25.5; 16.5; 24; 22; 22; 26 POWDER, FOR SOLUTION INTRAVENOUS at 16:46

## 2022-08-01 RX ADMIN — SODIUM CHLORIDE 125 MILLILITER(S): 9 INJECTION INTRAMUSCULAR; INTRAVENOUS; SUBCUTANEOUS at 22:00

## 2022-08-01 NOTE — ED PROVIDER NOTE - PHYSICAL EXAMINATION
GENERAL: Not in acute distress, non-toxic appearing  HEAD: normocephalic, atraumatic  HEENT: PERRLA, EOMI, normal conjunctiva, oral mucosa moist, neck supple, C cpine non tender to palpation  CARDIAC: regular rate and rhythm, normal S1 and S2,  no appreciable murmurs  PULM: clear to ascultation bilaterally, no crackles, rales, rhonchi, or wheezing  GI: abdomen nondistended, soft, nontender, no guarding or rebound tenderness  : no suprapubic tenderness  NEURO: alert and oriented x 3, normal speech, no focal motor or sensory deficits, gait normal, no gross neurologic deficit  MSK: Tenderness to palpation of the left hip and the left knee. Good DP pulses bilaterally, neurovascularly intact lower extremities bilat. No peripheral edema, calf tenderness/redness/swelling  SKIN: No visible rashes, dry, well-perfused  PSYCH: appropriate mood and affect

## 2022-08-01 NOTE — CONSULT NOTE ADULT - ASSESSMENT
83 yo woma with a hx of afib, HTN, IBD presents after a fall at home with a left hip fracture. Patient is scheduled for an Open reduction and internal fixation of the left hip

## 2022-08-01 NOTE — ED ADULT TRIAGE NOTE - MEANS OF ARRIVAL
Routing refill request to provider for review/approval because:  Needs diagnosis associated          stretcher

## 2022-08-01 NOTE — ED PROVIDER NOTE - ATTENDING CONTRIBUTION TO CARE
82F hx AFib on Coumadin, HLD, HTN, IBS, here w c/o L hip pain and inability to ambulate sp fall last night. Pt had mechanical trip and fall last night onto buttocks. No head strike or LOC. Was able to get up w assist and was placed in bed, this AM unable to ambulate or weight bear. On exam well appearing NCAT PERRL EOMI no midline CTL spine TTP no chest wall or pelvis TTP, abd soft ntnd, L hip TTP, LLE foreshortened and externally rotated. Palp DPs. FROM in BL UE and RLE. Unable to flex at L hip/knee. Suspect Acute L hip fx. Check pre-ops. Xrays, pain control, TBA.

## 2022-08-01 NOTE — H&P ADULT - HISTORY OF PRESENT ILLNESS
82y Female presents s/p [facility] fall c/o severe L hip pain and inability to ambulate.  Patient denies headstrike or LOC. Patient denies radiation of pain. Patient denies numbness/tingling/burning in the LLE. No other bone/joint complaints. Patient is a community ambulator at baseline with a walker    PAST MEDICAL & SURGICAL HISTORY:  Atrial fibrillation      Hypertension      Hyperlipemia      Hx of migraines  ophthalmic migraines      IBS (irritable colon syndrome)      History of colon polyps      DJD (degenerative joint disease)      S/P cataract surgery  left eye        MEDICATIONS  (STANDING):  acetaminophen     Tablet .. 975 milliGRAM(s) Oral every 8 hours  senna 2 Tablet(s) Oral at bedtime  sodium chloride 0.9%. 1000 milliLiter(s) (100 mL/Hr) IV Continuous <Continuous>    MEDICATIONS  (PRN):  HYDROmorphone  Injectable 0.5 milliGRAM(s) IV Push every 6 hours PRN Severe Pain (7 - 10)  magnesium hydroxide Suspension 30 milliLiter(s) Oral daily PRN Constipation  melatonin 3 milliGRAM(s) Oral at bedtime PRN Insomnia  oxyCODONE    IR 2.5 milliGRAM(s) Oral every 4 hours PRN Moderate Pain (4 - 6)  oxyCODONE    IR 5 milliGRAM(s) Oral every 4 hours PRN Severe Pain (7 - 10)    Allergies    penicillin (Rash)  Pradaxa (Other)  shellfish (Swelling)  sulfa drugs (Diarrhea)  Xarelto (Other)    Intolerances                              13.1   9.89  )-----------( 132      ( 01 Aug 2022 12:10 )             39.9     08-01    129<L>  |  99  |  15  ----------------------------<  136<H>  >9.0<HH>   |  25  |  0.64    Ca    9.2      01 Aug 2022 12:10    TPro  9.0<H>  /  Alb  4.6  /  TBili  0.7  /  DBili  x   /  AST  167<H>  /  ALT  65<H>  /  AlkPhos  137<H>  08-01    PT/INR - ( 01 Aug 2022 12:10 )   PT: 30.5 sec;   INR: 2.60 ratio         PTT - ( 01 Aug 2022 12:10 )  PTT:33.1 sec    T(C): 36.6 (08-01-22 @ 13:18), Max: 36.6 (08-01-22 @ 13:18)  HR: 85 (08-01-22 @ 13:18) (85 - 102)  BP: 125/84 (08-01-22 @ 13:18) (125/84 - 136/85)  RR: 18 (08-01-22 @ 13:18) (18 - 18)  SpO2: 97% (08-01-22 @ 13:18) (97% - 97%)  Wt(kg): --    PE   LLE:  Skin intact; No ecchymosis/soft tissue swelling  Compartments soft; + TTP about hip. No TTP to knee/leg/ankle/foot   ROM lmited 2/2 pain   Unable to SLR; + Log Roll/Heel Strike  Motor intact GS/TA/FHL/EHL  SILT L2-S1  +dp    RLE/BUE:   No bony TTP; Good ROM w/o pain; Exam Unremarkable    Imaging:  XR demonstrating L FN fx    82y Female with L FN fx  - Pain control  - NPO/IVF  - NWB LLE  - fu med clearance  - fu repeat CMP  - will receive [facility] on call to OR, already discussed with pharmacy for timing

## 2022-08-01 NOTE — CHART NOTE - NSCHARTNOTEFT_GEN_A_CORE
ORTHOPEDIC SURGERY POST-OP CHECK    S: Patient seen and examined at bedside POD0 s/p L linda. Pain well controlled with current regimen. Denies numbness/tingling in the extremity. Denies fever, chills, shortness of breath, and chest pain.     O: T(C): 36.8 (08-01-22 @ 23:20), Max: 36.9 (08-01-22 @ 18:00)  HR: 84 (08-01-22 @ 23:20) (84 - 108)  BP: 103/71 (08-01-22 @ 23:20) (100/58 - 155/97)  RR: 18 (08-01-22 @ 23:20) (14 - 20)  SpO2: 95% (08-01-22 @ 23:20) (92% - 99%)    Exam:   Gen: NAD, resting in bed  Resp: unlabored breathing  LLE: dressing c/d/i        +EHL/FHL/TA/GS         SILT Jasmine/Saph/Tib/DP/SP        2+ DP, cap refill <2 sec        08-01-22 @ 07:01  -  08-01-22 @ 23:51  --------------------------------------------------------  IN: 290 mL / OUT: 0 mL / NET: 290 mL          A/P: 82yFemale POD0 s/p L hip linda, recovering well  - Pain control  - WBAT LLE  - Anterior precautions  - DVT ppx: Coumadin, venodynes  - PT/OT  - OOB/AAT  - Regular diet  - Monitor I&Os

## 2022-08-01 NOTE — ED ADULT NURSE NOTE - OBJECTIVE STATEMENT
81 yo female complaining of fall last night "I fell yesterday and I am anxious, I do not want to get a MRI. I did not hit my head" pt alerted and oriented x3, no sings of acute distress at this moment. Pt complains of hip pain (left). Pt arrived on oxygen, as per EMS "We noticed pulse ox of 88 so we gave her 2L". Pt fell and went back to bed until this morning when she could not ambulate on her own. Family at the bedside, will continue to monitor closely.

## 2022-08-01 NOTE — ED PROVIDER NOTE - CLINICAL SUMMARY MEDICAL DECISION MAKING FREE TEXT BOX
82 year old male on anticoagulation (warfarin) comes into the ED today after fall last night with suspected left hip fracture. ECG and electrolytes ordered given her history of AFib but HPI seems consistent with mechanical fall rather than syncope. Ordered CT head, as well as radiographs of left hip, pelvis, and knee as well as type and screen, coags. Will likely be admitted.

## 2022-08-01 NOTE — CONSULT NOTE ADULT - PROBLEM/RECOMMENDATION-2
Gina Piper presents today for Chief Complaint Patient presents with  Hypertension f/u  Cholesterol Problem  Labs 1/18/19 Gina Piper preferred language for health care discussion is english/other. Is someone accompanying this pt? NO Is the patient using any DME equipment during OV? No 
 
Depression Screening: PHQ over the last two weeks 1/30/2019 Little interest or pleasure in doing things Not at all Feeling down, depressed, irritable, or hopeless Not at all Total Score PHQ 2 0 Learning Assessment: 
Learning Assessment 1/30/2019 PRIMARY LEARNER Patient HIGHEST LEVEL OF EDUCATION - PRIMARY LEARNER  4 YEARS OF COLLEGE  
BARRIERS PRIMARY LEARNER NONE  
CO-LEARNER CAREGIVER No  
PRIMARY LANGUAGE ENGLISH  NEED -  
LEARNER PREFERENCE PRIMARY READING  
  -  
ANSWERED BY self RELATIONSHIP SELF Abuse Screening: 
Abuse Screening Questionnaire 1/30/2019 Do you ever feel afraid of your partner? Mai Chaves Are you in a relationship with someone who physically or mentally threatens you? Mai Chaves Is it safe for you to go home? [place] Coordination of Care: 1. Have you been to the ER, urgent care clinic since your last visit? Hospitalized since your last visit? No 
 
2. Have you seen or consulted any other health care providers outside of the [address] [place] [address] since your last visit? Include any pap smears or colon screening. No 
 
 
 
Advance Directive: 1. Do you have an advance directive in place? Patient Reply:no 2. If not, would you like material regarding how to put one in place?  Patient Reply: no 
 
 
 DISPLAY PLAN FREE TEXT

## 2022-08-01 NOTE — ED PROVIDER NOTE - OBJECTIVE STATEMENT
82 year old female with history of AFib on anticoagulation with Warfarin comes into the ED for left hip and knee pain after a fall mechanical fall last night. She was walking around her bed she lost her footing and fell onto her gluts. The fall was witnessed by her  who states she did not hit her head and there was no LOC. She states she remembers everything before, during, and after the fall. She did not have any dizziness, presyncopal episodes, or palpitations prior to the fall. Her daughter heard the fall and came into the room to find the Pt in a seated position on the floor. The daughter helped her back into the bed. The Pt states she had some left leg pain but she did not want to come into the ED. This 82 year old female with history of AFib on anticoagulation with Warfarin comes into the ED for left hip and knee pain after a fall mechanical fall last night. She was walking around her bed she lost her footing and fell onto her gluts. The fall was witnessed by her  who states she did not hit her head and there was no LOC. She states she remembers everything before, during, and after the fall. She did not have any dizziness, presyncopal episodes, or palpitations prior to the fall. Her daughter heard the fall and came into the room to find the Pt in a seated position on the floor. The daughter helped her back into the bed. The Pt states she had some left leg pain but she did not want to come into the ED. This morning she was unable to stand or walk secondary to pain so she was brought into the ED via EMS. She denies any headache, lightheadedness, chest pain, palpitations, nausea, vomiting, abd pain, numbness or tingling in extremities.

## 2022-08-01 NOTE — ED ADULT NURSE REASSESSMENT NOTE - NS ED NURSE REASSESS COMMENT FT1
Pt's belongings removed, family kept all belongings. Pt remained with her pants and sheets from her home bed and pt refused to take it out "it would be too painful". Pt states OR can discard it  / "I just do not want to be in pain"

## 2022-08-01 NOTE — CONSULT NOTE ADULT - PROBLEM SELECTOR RECOMMENDATION 9
Patient scheduled for an Open reduction and internal fixation of the left hip  No contraindication to scheduled procedure  Patient is NPO  DVT and GI prophylaxis

## 2022-08-01 NOTE — CONSULT NOTE ADULT - PROBLEM SELECTOR RECOMMENDATION 3
continue atenolol and torsemide  will continue to monitor BP and adjust meds as needed  Low sodium diet

## 2022-08-01 NOTE — ED PROVIDER NOTE - NS ED ROS FT
GENERAL: No fever, chills  HEENT: No cough,   CARDIAC: No chest pain, palpitations, lightheadedness, syncope  PULM: No dyspnea, wheezing   GI: No abdominal pain, nausea, vomiting,  : No urinary dysuria  NEURO: + Difficulty walking, No headache, motor weakness, sensory changes  MSK: + Pain in the left knee and hip, No back pain,   SKIN: no rashes, hives, urticaria  HEME: no active bleeding, bruising

## 2022-08-01 NOTE — CONSULT NOTE ADULT - PROBLEM SELECTOR RECOMMENDATION 2
continue atenolol for rate control  anticoagulation to be hel and INR reversed  restart AC post op   Patient states she is allergic to NOACs

## 2022-08-01 NOTE — CONSULT NOTE ADULT - SUBJECTIVE AND OBJECTIVE BOX
82 year old female with history of AFib on anticoagulation with Warfarin comes into the ED for left hip and knee pain after a fall mechanical fall last night. She was walking around her bed she lost her footing and fell onto her buttocks. The fall was witnessed by her  who states she did not hit her head and there was no LOC. She states she remembers everything before, during, and after the fall. She did not have any dizziness, presyncopal episodes, or palpitations prior to the fall. Her daughter heard the fall and came into the room to find the Pt in a seated position on the floor. The daughter helped her back into the bed. The Pt states she had some left leg pain but she did not want to come into the ED. This morning she was unable to stand or walk secondary to pain so she was brought into the ED via EMS. In the ED the apt was found to have a left hip fracture and is scheduldd for an Open reduction and internal fixation of the left hip. Patient seen now resting comfortably      PAST MEDICAL & SURGICAL HISTORY:  Atrial fibrillation      Hypertension      Hyperlipemia      Hx of migraines  ophthalmic migraines      IBS (irritable colon syndrome)      History of colon polyps      DJD (degenerative joint disease)      S/P cataract surgery  left eye            MEDICATIONS  (STANDING):  acetaminophen     Tablet .. 975 milliGRAM(s) Oral every 8 hours  senna 2 Tablet(s) Oral at bedtime  sodium chloride 0.9%. 1000 milliLiter(s) (100 mL/Hr) IV Continuous <Continuous>    MEDICATIONS  (PRN):  HYDROmorphone  Injectable 0.5 milliGRAM(s) IV Push every 6 hours PRN Severe Pain (7 - 10)  magnesium hydroxide Suspension 30 milliLiter(s) Oral daily PRN Constipation  melatonin 3 milliGRAM(s) Oral at bedtime PRN Insomnia  oxyCODONE    IR 2.5 milliGRAM(s) Oral every 4 hours PRN Moderate Pain (4 - 6)  oxyCODONE    IR 5 milliGRAM(s) Oral every 4 hours PRN Severe Pain (7 - 10)    Social Hx:  Tobacco: former smoker  ETOH: neg  Drugs: neg    Family Hx:  As per my conversation with the patient, non contributory     ROS  CONSTITUTIONAL: No weakness, fevers or chills  EYES/ENT: No visual changes;  No vertigo or throat pain   NECK: No pain or stiffness  RESPIRATORY: No cough, wheezing, hemoptysis; No shortness of breath  CARDIOVASCULAR: No chest pain or palpitations  GASTROINTESTINAL: No abdominal or epigastric pain. No nausea, vomiting, or hematemesis; No diarrhea or constipation. No melena or hematochezia.  GENITOURINARY: No dysuria, frequency or hematuria  NEUROLOGICAL: No numbness or weakness  SKIN: No itching, burning, rashes, or lesions   MUSCULOSKELETAL: left leg pain    INTERVAL HPI/OVERNIGHT EVENTS:  T(C): 36.6 (08-01-22 @ 13:18), Max: 36.6 (08-01-22 @ 13:18)  HR: 85 (08-01-22 @ 13:18) (85 - 102)  BP: 125/84 (08-01-22 @ 13:18) (125/84 - 136/85)  RR: 18 (08-01-22 @ 13:18) (18 - 18)  SpO2: 97% (08-01-22 @ 13:18) (97% - 97%)  Wt(kg): --  I&O's Summary      PHYSICAL EXAM:  GENERAL: NAD, well-groomed, well-developed  HEAD:  Atraumatic, Normocephalic  EYES: EOMI, PERRLA, conjunctiva and sclera clear  ENMT: No tonsillar erythema, exudates, or enlargement; Moist mucous membranes, Good dentition, No lesions  NECK: Supple, No JVD, Normal thyroid  NERVOUS SYSTEM:  Alert & Oriented X3, Good concentration; Motor Strength 5/5 B/L upper and lower extremities; DTRs 2+ intact and symmetric  CHEST/LUNG: Clear to percussion bilaterally; No rales, rhonchi, wheezing, or rubs  HEART: Regular rate and rhythm; No murmurs, rubs, or gallops  ABDOMEN:mild tenderness, Nondistended; Bowel sounds present  EXTREMITIES:  shortening and external rotation of the left leg  LYMPH: No lymphadenopathy noted  SKIN: No rashes or lesions        LABS:                        13.1   9.89  )-----------( 132      ( 01 Aug 2022 12:10 )             39.9     08-01    140  |  103  |  14  ----------------------------<  129<H>  3.7   |  24  |  0.84    Ca    9.4      01 Aug 2022 14:39    TPro  7.1  /  Alb  4.3  /  TBili  0.6  /  DBili  x   /  AST  40  /  ALT  39  /  AlkPhos  154<H>  08-01    PT/INR - ( 01 Aug 2022 12:10 )   PT: 30.5 sec;   INR: 2.60 ratio         PTT - ( 01 Aug 2022 12:10 )  PTT:33.1 sec      EKG Afib @ 102 RBBB

## 2022-08-01 NOTE — ED PROVIDER NOTE - NSICDXPASTMEDICALHX_GEN_ALL_CORE_FT
PAST MEDICAL HISTORY:  Atrial fibrillation     DJD (degenerative joint disease)     History of colon polyps     Hx of migraines ophthalmic migraines    Hyperlipemia     Hypertension     IBS (irritable colon syndrome)

## 2022-08-02 LAB
ANION GAP SERPL CALC-SCNC: 11 MMOL/L — SIGNIFICANT CHANGE UP (ref 5–17)
APTT BLD: 29.3 SEC — SIGNIFICANT CHANGE UP (ref 27.5–35.5)
BASOPHILS # BLD AUTO: 0.01 K/UL — SIGNIFICANT CHANGE UP (ref 0–0.2)
BASOPHILS NFR BLD AUTO: 0.1 % — SIGNIFICANT CHANGE UP (ref 0–2)
BUN SERPL-MCNC: 10 MG/DL — SIGNIFICANT CHANGE UP (ref 7–23)
CALCIUM SERPL-MCNC: 9 MG/DL — SIGNIFICANT CHANGE UP (ref 8.4–10.5)
CHLORIDE SERPL-SCNC: 108 MMOL/L — SIGNIFICANT CHANGE UP (ref 96–108)
CO2 SERPL-SCNC: 24 MMOL/L — SIGNIFICANT CHANGE UP (ref 22–31)
CREAT SERPL-MCNC: 0.63 MG/DL — SIGNIFICANT CHANGE UP (ref 0.5–1.3)
EGFR: 87 ML/MIN/1.73M2 — SIGNIFICANT CHANGE UP
EOSINOPHIL # BLD AUTO: 0 K/UL — SIGNIFICANT CHANGE UP (ref 0–0.5)
EOSINOPHIL NFR BLD AUTO: 0 % — SIGNIFICANT CHANGE UP (ref 0–6)
GLUCOSE SERPL-MCNC: 142 MG/DL — HIGH (ref 70–99)
HCT VFR BLD CALC: 33 % — LOW (ref 34.5–45)
HGB BLD-MCNC: 10.6 G/DL — LOW (ref 11.5–15.5)
IMM GRANULOCYTES NFR BLD AUTO: 0.6 % — SIGNIFICANT CHANGE UP (ref 0–1.5)
INR BLD: 2.32 RATIO — HIGH (ref 0.88–1.16)
LYMPHOCYTES # BLD AUTO: 1.26 K/UL — SIGNIFICANT CHANGE UP (ref 1–3.3)
LYMPHOCYTES # BLD AUTO: 12.5 % — LOW (ref 13–44)
MCHC RBC-ENTMCNC: 32.1 GM/DL — SIGNIFICANT CHANGE UP (ref 32–36)
MCHC RBC-ENTMCNC: 33.2 PG — SIGNIFICANT CHANGE UP (ref 27–34)
MCV RBC AUTO: 103.4 FL — HIGH (ref 80–100)
MONOCYTES # BLD AUTO: 0.65 K/UL — SIGNIFICANT CHANGE UP (ref 0–0.9)
MONOCYTES NFR BLD AUTO: 6.4 % — SIGNIFICANT CHANGE UP (ref 2–14)
NEUTROPHILS # BLD AUTO: 8.12 K/UL — HIGH (ref 1.8–7.4)
NEUTROPHILS NFR BLD AUTO: 80.4 % — HIGH (ref 43–77)
NRBC # BLD: 0 /100 WBCS — SIGNIFICANT CHANGE UP (ref 0–0)
PLATELET # BLD AUTO: 113 K/UL — LOW (ref 150–400)
POTASSIUM SERPL-MCNC: 4.2 MMOL/L — SIGNIFICANT CHANGE UP (ref 3.5–5.3)
POTASSIUM SERPL-SCNC: 4.2 MMOL/L — SIGNIFICANT CHANGE UP (ref 3.5–5.3)
PROTHROM AB SERPL-ACNC: 27.2 SEC — HIGH (ref 10.5–13.4)
RBC # BLD: 3.19 M/UL — LOW (ref 3.8–5.2)
RBC # FLD: 13.7 % — SIGNIFICANT CHANGE UP (ref 10.3–14.5)
SODIUM SERPL-SCNC: 143 MMOL/L — SIGNIFICANT CHANGE UP (ref 135–145)
WBC # BLD: 10.1 K/UL — SIGNIFICANT CHANGE UP (ref 3.8–10.5)
WBC # FLD AUTO: 10.1 K/UL — SIGNIFICANT CHANGE UP (ref 3.8–10.5)

## 2022-08-02 RX ORDER — WARFARIN SODIUM 2.5 MG/1
2.5 TABLET ORAL ONCE
Refills: 0 | Status: COMPLETED | OUTPATIENT
Start: 2022-08-02 | End: 2022-08-02

## 2022-08-02 RX ORDER — CLONAZEPAM 1 MG
0.25 TABLET ORAL
Refills: 0 | Status: DISCONTINUED | OUTPATIENT
Start: 2022-08-02 | End: 2022-08-06

## 2022-08-02 RX ORDER — SODIUM CHLORIDE 9 MG/ML
500 INJECTION INTRAMUSCULAR; INTRAVENOUS; SUBCUTANEOUS ONCE
Refills: 0 | Status: COMPLETED | OUTPATIENT
Start: 2022-08-02 | End: 2022-08-02

## 2022-08-02 RX ADMIN — Medication 975 MILLIGRAM(S): at 12:00

## 2022-08-02 RX ADMIN — WARFARIN SODIUM 2.5 MILLIGRAM(S): 2.5 TABLET ORAL at 20:39

## 2022-08-02 RX ADMIN — BUPROPION HYDROCHLORIDE 300 MILLIGRAM(S): 150 TABLET, EXTENDED RELEASE ORAL at 11:01

## 2022-08-02 RX ADMIN — SIMVASTATIN 10 MILLIGRAM(S): 20 TABLET, FILM COATED ORAL at 20:39

## 2022-08-02 RX ADMIN — Medication 0.5 MILLIGRAM(S): at 20:38

## 2022-08-02 RX ADMIN — QUETIAPINE FUMARATE 150 MILLIGRAM(S): 200 TABLET, FILM COATED ORAL at 17:37

## 2022-08-02 RX ADMIN — Medication 975 MILLIGRAM(S): at 21:10

## 2022-08-02 RX ADMIN — Medication 975 MILLIGRAM(S): at 11:00

## 2022-08-02 RX ADMIN — Medication 975 MILLIGRAM(S): at 03:40

## 2022-08-02 RX ADMIN — Medication 0.25 MILLIGRAM(S): at 06:03

## 2022-08-02 RX ADMIN — OXYCODONE HYDROCHLORIDE 5 MILLIGRAM(S): 5 TABLET ORAL at 13:15

## 2022-08-02 RX ADMIN — Medication 975 MILLIGRAM(S): at 03:09

## 2022-08-02 RX ADMIN — SODIUM CHLORIDE 500 MILLILITER(S): 9 INJECTION INTRAMUSCULAR; INTRAVENOUS; SUBCUTANEOUS at 11:45

## 2022-08-02 RX ADMIN — OXYCODONE HYDROCHLORIDE 5 MILLIGRAM(S): 5 TABLET ORAL at 17:36

## 2022-08-02 RX ADMIN — Medication 975 MILLIGRAM(S): at 20:39

## 2022-08-02 RX ADMIN — OXYCODONE HYDROCHLORIDE 5 MILLIGRAM(S): 5 TABLET ORAL at 18:00

## 2022-08-02 RX ADMIN — Medication 10 MILLIGRAM(S): at 06:02

## 2022-08-02 RX ADMIN — Medication 100 MILLIGRAM(S): at 03:10

## 2022-08-02 RX ADMIN — SENNA PLUS 2 TABLET(S): 8.6 TABLET ORAL at 20:38

## 2022-08-02 RX ADMIN — OXYCODONE HYDROCHLORIDE 5 MILLIGRAM(S): 5 TABLET ORAL at 12:45

## 2022-08-02 RX ADMIN — Medication 3 MILLIGRAM(S): at 20:39

## 2022-08-02 RX ADMIN — Medication 0.25 MILLIGRAM(S): at 11:43

## 2022-08-02 RX ADMIN — ATENOLOL 50 MILLIGRAM(S): 25 TABLET ORAL at 06:02

## 2022-08-02 RX ADMIN — Medication 100 MILLIGRAM(S): at 11:01

## 2022-08-02 NOTE — OCCUPATIONAL THERAPY INITIAL EVALUATION ADULT - ANTICIPATED DISCHARGE DISPOSITION, OT EVAL
TBD pending further functional mobility/ADL performance. anticipate home OT with assist for all ADLs/functional mobility

## 2022-08-02 NOTE — PATIENT PROFILE ADULT - FALL HARM RISK - HARM RISK INTERVENTIONS
Assistance with ambulation/Assistance OOB with selected safe patient handling equipment/Communicate Risk of Fall with Harm to all staff/Monitor gait and stability/Reinforce activity limits and safety measures with patient and family/Sit up slowly, dangle for a short time, stand at bedside before walking/Tailored Fall Risk Interventions/Use of alarms - bed, chair and/or voice tab/Visual Cue: Yellow wristband and red socks/Bed in lowest position, wheels locked, appropriate side rails in place/Call bell, personal items and telephone in reach/Instruct patient to call for assistance before getting out of bed or chair/Non-slip footwear when patient is out of bed/[place] to call system/Physically safe environment - no spills, clutter or unnecessary equipment/Purposeful Proactive Rounding/Room/bathroom lighting operational, light cord in reach Assistance with ambulation/Assistance OOB with selected safe patient handling equipment/Communicate Risk of Fall with Harm to all staff/Discuss with provider need for PT consult/Monitor gait and stability/Provide patient with walking aids - walker, cane, crutches/Reinforce activity limits and safety measures with patient and family/Sit up slowly, dangle for a short time, stand at bedside before walking/Tailored Fall Risk Interventions/Use of alarms - bed, chair and/or voice tab/Visual Cue: Yellow wristband and red socks/Bed in lowest position, wheels locked, appropriate side rails in place/Call bell, personal items and telephone in reach/Instruct patient to call for assistance before getting out of bed or chair/Non-slip footwear when patient is out of bed/[place] to call system/Physically safe environment - no spills, clutter or unnecessary equipment/Purposeful Proactive Rounding/Room/bathroom lighting operational, light cord in reach

## 2022-08-02 NOTE — OCCUPATIONAL THERAPY INITIAL EVALUATION ADULT - ADDITIONAL COMMENTS
Pt lives in an apt with spouse with no steps to negotiate. Pt was Ind with all ADLs (occasional assist from  for LB dressing) and amb with RW as needed. walk-in shower, GBs. Daughter planning on staying at pts home for period of time to assist as needed.

## 2022-08-02 NOTE — PHYSICAL THERAPY INITIAL EVALUATION ADULT - PERTINENT HX OF CURRENT PROBLEM, REHAB EVAL
Pt is a 81 y/o female admitted to [facility] L hip pain and inability to ambulate.  Patient denies headstrike or LOC. Patient denies radiation of pain. Patient denies numbness/tingling/burning in the LLE. (-) CT head. XR pelvis:  Acute superolaterally displaced left femoral neck subcapital fracture.

## 2022-08-02 NOTE — OCCUPATIONAL THERAPY INITIAL EVALUATION ADULT - TRANSFER TRAINING, PT EVAL
pt will perform transfer to commode with I in 4 weeks using LRAD / pt will perform shower transfer to shower chair with I in 4 weeks

## 2022-08-02 NOTE — CHART NOTE - NSCHARTNOTEFT_GEN_A_CORE
Due to the nature of this patient's injuries s/p L hemiarthroplasty, patient will require a transport wheelchair.  This is to assist with performing activities of daily living not able to be performed with other assistive equipment, other assistive devices have been attempted.  Having the wheelchair will allow patient to perform these activities of daily living.  Patient will have family assist with equipment, and both are in agreement.    Maria Luisa Glasgow PA-C  #[id number]

## 2022-08-02 NOTE — OCCUPATIONAL THERAPY INITIAL EVALUATION ADULT - PERTINENT HX OF CURRENT PROBLEM, REHAB EVAL
82y Female presents s/p [facility] fall c/o severe L hip pain and inability to ambulate.  Patient denies headstrike or LOC. Patient is a community ambulator at baseline with a walker. now s/p L hip hemiarthoplasty, anterior approach.

## 2022-08-03 DIAGNOSIS — F41.9 ANXIETY DISORDER, UNSPECIFIED: ICD-10-CM

## 2022-08-03 LAB
APPEARANCE UR: CLEAR — SIGNIFICANT CHANGE UP
BACTERIA # UR AUTO: 0 — SIGNIFICANT CHANGE UP
BILIRUB UR-MCNC: NEGATIVE — SIGNIFICANT CHANGE UP
COLOR SPEC: SIGNIFICANT CHANGE UP
DIFF PNL FLD: NEGATIVE — SIGNIFICANT CHANGE UP
EPI CELLS # UR: 0 /HPF — SIGNIFICANT CHANGE UP
GLUCOSE UR QL: ABNORMAL
HCT VFR BLD CALC: 28.7 % — LOW (ref 34.5–45)
HGB BLD-MCNC: 9.3 G/DL — LOW (ref 11.5–15.5)
HYALINE CASTS # UR AUTO: 1 /LPF — SIGNIFICANT CHANGE UP (ref 0–2)
INR BLD: 2.32 RATIO — HIGH (ref 0.88–1.16)
KETONES UR-MCNC: SIGNIFICANT CHANGE UP
LEUKOCYTE ESTERASE UR-ACNC: NEGATIVE — SIGNIFICANT CHANGE UP
MCHC RBC-ENTMCNC: 32.4 GM/DL — SIGNIFICANT CHANGE UP (ref 32–36)
MCHC RBC-ENTMCNC: 33.1 PG — SIGNIFICANT CHANGE UP (ref 27–34)
MCV RBC AUTO: 102.1 FL — HIGH (ref 80–100)
NITRITE UR-MCNC: NEGATIVE — SIGNIFICANT CHANGE UP
NRBC # BLD: 0 /100 WBCS — SIGNIFICANT CHANGE UP (ref 0–0)
PH UR: 6 — SIGNIFICANT CHANGE UP (ref 5–8)
PLATELET # BLD AUTO: 127 K/UL — LOW (ref 150–400)
PROT UR-MCNC: ABNORMAL
PROTHROM AB SERPL-ACNC: 27.1 SEC — HIGH (ref 10.5–13.4)
RBC # BLD: 2.81 M/UL — LOW (ref 3.8–5.2)
RBC # FLD: 14.1 % — SIGNIFICANT CHANGE UP (ref 10.3–14.5)
RBC CASTS # UR COMP ASSIST: 0 /HPF — SIGNIFICANT CHANGE UP (ref 0–4)
SP GR SPEC: 1.02 — SIGNIFICANT CHANGE UP (ref 1.01–1.02)
UROBILINOGEN FLD QL: NEGATIVE — SIGNIFICANT CHANGE UP
WBC # BLD: 9.65 K/UL — SIGNIFICANT CHANGE UP (ref 3.8–10.5)
WBC # FLD AUTO: 9.65 K/UL — SIGNIFICANT CHANGE UP (ref 3.8–10.5)
WBC UR QL: 1 /HPF — SIGNIFICANT CHANGE UP (ref 0–5)

## 2022-08-03 RX ORDER — WARFARIN SODIUM 2.5 MG/1
2.5 TABLET ORAL ONCE
Refills: 0 | Status: COMPLETED | OUTPATIENT
Start: 2022-08-03 | End: 2022-08-03

## 2022-08-03 RX ORDER — DESVENLAFAXINE 50 MG/1
100 TABLET, EXTENDED RELEASE ORAL DAILY
Refills: 0 | Status: DISCONTINUED | OUTPATIENT
Start: 2022-08-03 | End: 2022-08-06

## 2022-08-03 RX ORDER — SIMVASTATIN 20 MG/1
1 TABLET, FILM COATED ORAL
Qty: 0 | Refills: 0 | DISCHARGE

## 2022-08-03 RX ORDER — CLONAZEPAM 1 MG
1 TABLET ORAL
Qty: 0 | Refills: 0 | DISCHARGE

## 2022-08-03 RX ORDER — QUETIAPINE FUMARATE 200 MG/1
1 TABLET, FILM COATED ORAL
Qty: 0 | Refills: 0 | DISCHARGE

## 2022-08-03 RX ORDER — DESVENLAFAXINE 50 MG/1
1 TABLET, EXTENDED RELEASE ORAL
Qty: 0 | Refills: 0 | DISCHARGE

## 2022-08-03 RX ORDER — ATENOLOL 25 MG/1
1 TABLET ORAL
Qty: 0 | Refills: 0 | DISCHARGE

## 2022-08-03 RX ORDER — BUPROPION HYDROCHLORIDE 150 MG/1
1 TABLET, EXTENDED RELEASE ORAL
Qty: 0 | Refills: 0 | DISCHARGE

## 2022-08-03 RX ORDER — LACTULOSE 10 G/15ML
20 SOLUTION ORAL THREE TIMES A DAY
Refills: 0 | Status: DISCONTINUED | OUTPATIENT
Start: 2022-08-03 | End: 2022-08-06

## 2022-08-03 RX ORDER — FOLIC ACID 7.5 MG
1 TABLET ORAL
Qty: 0 | Refills: 0 | DISCHARGE

## 2022-08-03 RX ORDER — QUETIAPINE FUMARATE 200 MG/1
150 TABLET, FILM COATED ORAL
Qty: 0 | Refills: 0 | DISCHARGE

## 2022-08-03 RX ADMIN — LACTULOSE 20 GRAM(S): 10 SOLUTION ORAL at 18:01

## 2022-08-03 RX ADMIN — SIMVASTATIN 10 MILLIGRAM(S): 20 TABLET, FILM COATED ORAL at 21:29

## 2022-08-03 RX ADMIN — Medication 975 MILLIGRAM(S): at 22:30

## 2022-08-03 RX ADMIN — WARFARIN SODIUM 2.5 MILLIGRAM(S): 2.5 TABLET ORAL at 21:29

## 2022-08-03 RX ADMIN — DESVENLAFAXINE 100 MILLIGRAM(S): 50 TABLET, EXTENDED RELEASE ORAL at 17:24

## 2022-08-03 RX ADMIN — SENNA PLUS 2 TABLET(S): 8.6 TABLET ORAL at 21:29

## 2022-08-03 RX ADMIN — BUPROPION HYDROCHLORIDE 300 MILLIGRAM(S): 150 TABLET, EXTENDED RELEASE ORAL at 12:09

## 2022-08-03 RX ADMIN — QUETIAPINE FUMARATE 150 MILLIGRAM(S): 200 TABLET, FILM COATED ORAL at 18:02

## 2022-08-03 RX ADMIN — Medication 0.25 MILLIGRAM(S): at 12:12

## 2022-08-03 RX ADMIN — Medication 975 MILLIGRAM(S): at 12:09

## 2022-08-03 RX ADMIN — Medication 975 MILLIGRAM(S): at 21:29

## 2022-08-03 RX ADMIN — ATENOLOL 50 MILLIGRAM(S): 25 TABLET ORAL at 07:57

## 2022-08-04 ENCOUNTER — TRANSCRIPTION ENCOUNTER (OUTPATIENT)
Age: 82
End: 2022-08-04

## 2022-08-04 LAB
ANION GAP SERPL CALC-SCNC: 9 MMOL/L — SIGNIFICANT CHANGE UP (ref 5–17)
BUN SERPL-MCNC: 11 MG/DL — SIGNIFICANT CHANGE UP (ref 7–23)
CALCIUM SERPL-MCNC: 9.2 MG/DL — SIGNIFICANT CHANGE UP (ref 8.4–10.5)
CHLORIDE SERPL-SCNC: 101 MMOL/L — SIGNIFICANT CHANGE UP (ref 96–108)
CO2 SERPL-SCNC: 27 MMOL/L — SIGNIFICANT CHANGE UP (ref 22–31)
CREAT SERPL-MCNC: 0.57 MG/DL — SIGNIFICANT CHANGE UP (ref 0.5–1.3)
EGFR: 90 ML/MIN/1.73M2 — SIGNIFICANT CHANGE UP
GLUCOSE SERPL-MCNC: 169 MG/DL — HIGH (ref 70–99)
HCT VFR BLD CALC: 26.6 % — LOW (ref 34.5–45)
HGB BLD-MCNC: 8.8 G/DL — LOW (ref 11.5–15.5)
INR BLD: 2.22 RATIO — HIGH (ref 0.88–1.16)
MCHC RBC-ENTMCNC: 33.1 GM/DL — SIGNIFICANT CHANGE UP (ref 32–36)
MCHC RBC-ENTMCNC: 33.5 PG — SIGNIFICANT CHANGE UP (ref 27–34)
MCV RBC AUTO: 101.1 FL — HIGH (ref 80–100)
NRBC # BLD: 0 /100 WBCS — SIGNIFICANT CHANGE UP (ref 0–0)
PLATELET # BLD AUTO: 145 K/UL — LOW (ref 150–400)
POTASSIUM SERPL-MCNC: 3.2 MMOL/L — LOW (ref 3.5–5.3)
POTASSIUM SERPL-SCNC: 3.2 MMOL/L — LOW (ref 3.5–5.3)
PROTHROM AB SERPL-ACNC: 26 SEC — HIGH (ref 10.5–13.4)
RBC # BLD: 2.63 M/UL — LOW (ref 3.8–5.2)
RBC # FLD: 13.9 % — SIGNIFICANT CHANGE UP (ref 10.3–14.5)
SODIUM SERPL-SCNC: 137 MMOL/L — SIGNIFICANT CHANGE UP (ref 135–145)
WBC # BLD: 9.24 K/UL — SIGNIFICANT CHANGE UP (ref 3.8–10.5)
WBC # FLD AUTO: 9.24 K/UL — SIGNIFICANT CHANGE UP (ref 3.8–10.5)

## 2022-08-04 RX ORDER — WARFARIN SODIUM 2.5 MG/1
2.5 TABLET ORAL ONCE
Refills: 0 | Status: COMPLETED | OUTPATIENT
Start: 2022-08-04 | End: 2022-08-04

## 2022-08-04 RX ADMIN — TRAMADOL HYDROCHLORIDE 25 MILLIGRAM(S): 50 TABLET ORAL at 18:10

## 2022-08-04 RX ADMIN — SIMVASTATIN 10 MILLIGRAM(S): 20 TABLET, FILM COATED ORAL at 21:09

## 2022-08-04 RX ADMIN — Medication 975 MILLIGRAM(S): at 12:15

## 2022-08-04 RX ADMIN — BUPROPION HYDROCHLORIDE 300 MILLIGRAM(S): 150 TABLET, EXTENDED RELEASE ORAL at 12:14

## 2022-08-04 RX ADMIN — Medication 975 MILLIGRAM(S): at 21:39

## 2022-08-04 RX ADMIN — WARFARIN SODIUM 2.5 MILLIGRAM(S): 2.5 TABLET ORAL at 21:09

## 2022-08-04 RX ADMIN — Medication 10 MILLIGRAM(S): at 18:05

## 2022-08-04 RX ADMIN — SENNA PLUS 2 TABLET(S): 8.6 TABLET ORAL at 21:10

## 2022-08-04 RX ADMIN — Medication 0.25 MILLIGRAM(S): at 12:14

## 2022-08-04 RX ADMIN — DESVENLAFAXINE 100 MILLIGRAM(S): 50 TABLET, EXTENDED RELEASE ORAL at 12:24

## 2022-08-04 RX ADMIN — QUETIAPINE FUMARATE 150 MILLIGRAM(S): 200 TABLET, FILM COATED ORAL at 18:05

## 2022-08-04 RX ADMIN — TRAMADOL HYDROCHLORIDE 25 MILLIGRAM(S): 50 TABLET ORAL at 19:10

## 2022-08-04 RX ADMIN — Medication 975 MILLIGRAM(S): at 21:09

## 2022-08-04 NOTE — DISCHARGE NOTE PROVIDER - NS AS DC PROVIDER CONTACT Y/N MULTI
Detail Level: Zone Add 78034 Cpt? (Important Note: In 2017 The Use Of 80215 Is Being Tracked By Cms To Determine Future Global Period Reimbursement For Global Periods): yes Wound Evaluated By (Optional): Christen Alfonso MA Wound Diameter In Cm(Optional): 0 Wound Crusting?: clean Wound Color?: pink Wound Granulation?: early Yes

## 2022-08-04 NOTE — DISCHARGE NOTE PROVIDER - NSDCFUADDINST_GEN_ALL_CORE_FT
Please follow up with your doctor 14 days after your discharge from the hospital (call for appointment).  PT-weight bearing as tolerated with anterior hip precautions.  Lovenox daily x 6 weeks total for dvt prevention.  Keep dressing clean and intact, have doctor remove staples/sutures post op day 14 (if applicable) and apply steristrips.  Please follow up with your PMD within 1 month for routine checkup.  Please follow up with Dr. Simms on POD# 14 (call for appointment).    Continue Coumadin  for Afib to keep INR 2-3.  PT-weight bearing as tolerated on left leg with anterior hip precautions.   Keep dressing clean and intact, have doctor remove staples/sutures post op day #14 (if applicable) and apply steristrips.    Please follow up with your PMD within 1 month for routine checkup.  Please follow up with Dr. Simms on post-operative day#14 (8/15/22, call for appointment).    Continue Coumadin  for Afib to keep INR 2-3.  PT-weight bearing as tolerated on left leg with anterior hip precautions.   Keep dressing clean and intact, have doctor remove staples/sutures post op day #14 (if applicable) and apply steristrips.    Please call the [facility] to schedule a harris catheter removal and trial of void for next week.  Please follow up with your PMD within 1 month for routine checkup.

## 2022-08-04 NOTE — DISCHARGE NOTE PROVIDER - HOSPITAL COURSE
History of Present Illness:   82y Female presents s/p [facility] fall c/o severe L hip pain and inability to ambulate.  Patient denies headstrike or LOC. Patient denies radiation of pain. Patient denies numbness/tingling/burning in the LLE. No other bone/joint complaints. Patient is a community ambulator at baseline with a walker    PAST MEDICAL & SURGICAL HISTORY:  Atrial fibrillation  Hypertension  Hyperlipemia  Hx of migraines-ophthalmic migraines  IBS (irritable colon syndrome)  History of colon polyps  DJD (degenerative joint disease)  S/P cataract surgery-left eye    This is an 81 year old Female  admitted to [facility] on 8/1/22 after a mechanical fall.  Patient found to have L hip Fracture.  Patient evaluated and cleared by Medicine for operative procedure.  On 8/1, patient underwent an uncomplicated hip hemiarthroplasty.  Evaluated and treated by PT, recommended for Subacute Rehab.  Patient declined and requested home.  Remain of hospital stay unremarkable, and patient discharged home. History of Present Illness:   82y Female presents s/p [facility] fall c/o severe L hip pain and inability to ambulate.  Patient denies headstrike or LOC. Patient denies radiation of pain. Patient denies numbness/tingling/burning in the LLE. No other bone/joint complaints. Patient is a community ambulator at baseline with a walker    PAST MEDICAL & SURGICAL HISTORY:  Atrial fibrillation  Hypertension  Hyperlipemia  Hx of migraines-ophthalmic migraines  IBS (irritable colon syndrome)  History of colon polyps  DJD (degenerative joint disease)  S/P cataract surgery-left eye    Hospital Course:  This is an 81 year old Female  admitted to [facility] on 8/1/22 after a mechanical fall.  Patient found to have L hip Fracture.  Patient evaluated and cleared by Medicine for operative procedure.  On 8/1, patient underwent an uncomplicated hip hemiarthroplasty.  Evaluated and treated by PT, recommended for Subacute Rehab.  Patient declined and requested home.  Remain of hospital stay unremarkable, and patient discharged home. History of Present Illness:   82y Female presents s/p [facility] fall c/o severe L hip pain and inability to ambulate.  Patient denies headstrike or LOC. Patient denies radiation of pain. Patient denies numbness/tingling/burning in the LLE. No other bone/joint complaints. Patient is a community ambulator at baseline with a walker    PAST MEDICAL & SURGICAL HISTORY:  Atrial fibrillation  Hypertension  Hyperlipemia  Hx of migraines-ophthalmic migraines  IBS (irritable colon syndrome)  History of colon polyps  DJD (degenerative joint disease)  S/P cataract surgery-left eye    Hospital Course:  This is an 81 year old Female  admitted to [facility] on 8/1/22 after a mechanical fall.  Patient found to have L hip Fracture.  Patient evaluated and cleared by Medicine for operative procedure.  On 8/1, patient underwent an uncomplicated hip hemiarthroplasty.  Evaluated and treated by PT, recommended for Subacute Rehab.  Patient declined and requested home. Patient was straight catheterized x 3 throughout hospital stay due to urinary retention. Patient did not pass trial of void, so an indwelling harris catheter was placed with leg bag. Family taught how to drain leg bag and instructed to have patient follow up with [facility] for a trial of void/harris catheter removal. Remain of hospital stay unremarkable, and patient discharged home.

## 2022-08-04 NOTE — DISCHARGE NOTE PROVIDER - NSDCMRMEDTOKEN_GEN_ALL_CORE_FT
3:1 Commode: NIKI 99 months  s/p L hemiarthroplasty  atenolol 50 mg oral tablet: 1 tab(s) orally once a day  buPROPion 300 mg/24 hours (XL) oral tablet, extended release: 1 tab(s) orally every 24 hours  desvenlafaxine (as base) 100 mg oral tablet, extended release: 1 tab(s) orally once a day  KlonoPIN 0.25 mg oral tablet: 1 tab(s) orally 2 times a day  KlonoPIN 0.5 mg oral tablet: 1 tab(s) orally once a day (at bedtime)  l-methylfolate 15 mg oral tablet: 1 tab(s) orally once a day  SEROquel: 150 milligram(s) orally once a day  SEROquel XR 50 mg oral tablet, extended release: 1 tab(s) orally once a day (in the evening)  torsemide 10 mg oral tablet: 1 tab(s) orally once a day  Transport Wheelchair: NIKI 99 months  s/p L hemiarthroplasty  Zocor 10 mg oral tablet: 1 tab(s) orally once a day (at bedtime)   acetaminophen 325 mg oral tablet: 3 tab(s) orally every 8 hours  atenolol 50 mg oral tablet: 1 tab(s) orally once a day  buPROPion 300 mg/24 hours (XL) oral tablet, extended release: 1 tab(s) orally every 24 hours  desvenlafaxine (as base) 100 mg oral tablet, extended release: 1 tab(s) orally once a day  KlonoPIN 0.25 mg oral tablet: 1 tab(s) orally 2 times a day  KlonoPIN 0.5 mg oral tablet: 1 tab(s) orally once a day (at bedtime)  l-methylfolate 15 mg oral tablet: 1 tab(s) orally once a day  senna leaf extract oral tablet: 2 tab(s) orally once a day (at bedtime)  SEROquel: 150 milligram(s) orally once a day  SEROquel XR 50 mg oral tablet, extended release: 1 tab(s) orally once a day (in the evening)  torsemide 10 mg oral tablet: 1 tab(s) orally once a day  traMADol 50 mg oral tablet: 0.5 tab(s) orally every 8 hours, As needed, Severe Pain MDD:2  Zocor 10 mg oral tablet: 1 tab(s) orally once a day (at bedtime)

## 2022-08-04 NOTE — DISCHARGE NOTE PROVIDER - NSDCCPCAREPLAN_GEN_ALL_CORE_FT
PRINCIPAL DISCHARGE DIAGNOSIS  Diagnosis: Femur neck fracture  Assessment and Plan of Treatment:

## 2022-08-04 NOTE — DISCHARGE NOTE PROVIDER - CARE PROVIDER_API CALL
Derick Simms (MD)  Orthopaedic Surgery  [address]  [place] [address]  Phone: [phone]  Fax: [phone]  Follow Up Time:    Derick Simms (MD)  Orthopaedic Surgery  [address]  [place] [address]  Phone: [phone]  Fax: [phone]  Follow Up Time:     [facility],   [address] [place], [place] [address], [place] [address]  Phone: [phone]  Fax: (   )    -  Follow Up Time: 1-3 days

## 2022-08-04 NOTE — DISCHARGE NOTE NURSING/CASE MANAGEMENT/SOCIAL WORK - NSDCPEFALRISK_GEN_ALL_CORE
- MRI showed areas of diffusion signal hyperintensity consistent with areas of acute ischemia/infarct involving the left cerebral hemisphere, the most prominent measuring approximately 1.4 cm, also a small focus of the right frontal lobe.  - Patient on full dose anticoagulation  - Sinus rhythm noted throughout hospital stay  - Risk factor modification - control diabetes, continue statin.  - RPR, HIV non reactive.  B12 low normal.   - CTA showed a focal segment of 60-70% stenosis involving the proximal to mid left vertebral artery that was new when compared to the prior study of 09/25/2019.  No evidence of large vessel intracranial occlusion.   - Neurology noted symptoms do not correspond to stroke location.  - Echo with bubble study done, no shunt seen.  - Follow up with vascular neurology in 4 weeks.  -Therapy recommending SNF due to his low interest in participation.  - Started Lexapro due to suspicion for depression.     For information on Fall & Injury Prevention, visit: [email or web address] OR  [email or web address] OR  https://www.cdc.gov/steadi/patient.html

## 2022-08-04 NOTE — DISCHARGE NOTE PROVIDER - PROVIDER TOKENS
PROVIDER:[TOKEN:[[id number]]] PROVIDER:[TOKEN:[[id number]]],FREE:[LAST:[[facility]],PHONE:[phone]],FAX:[(   )    -],ADDRESS:[[address] [place] [address]],FOLLOWUP:[1-3 days]]

## 2022-08-04 NOTE — DISCHARGE NOTE PROVIDER - NSDCFUSCHEDAPPT_GEN_ALL_CORE_FT
Detail Level: Detailed General Sunscreen Counseling: I recommended a broad spectrum sunscreen with a SPF of 30 or higher.  I explained that SPF 30 sunscreens block approximately 97 percent of the sun's harmful rays.  Sunscreens should be applied at least 15 minutes prior to expected sun exposure and then every 2 hours after that as long as sun exposure continues. If swimming or exercising sunscreen should be reapplied every 45 minutes to an hour after getting wet or sweating.  One ounce, or the equivalent of a shot glass full of sunscreen, is adequate to protect the skin not covered by a bathing suit. I also recommended a lip balm with a sunscreen as well. Sun protective clothing can be used in lieu of sunscreen but must be worn the entire time you are exposed to the sun's rays. Tomas Parmar  [facility] Physician On license of [facility]  CARDIOLOGY [address]   Scheduled Appointment: 09/12/2022

## 2022-08-04 NOTE — DISCHARGE NOTE NURSING/CASE MANAGEMENT/SOCIAL WORK - PATIENT PORTAL LINK FT
You can access the FollowMyHealth Patient Portal offered by [facility] by registering at the following website: [email or web address]. By joining [employer]’s FollowMyHealth portal, you will also be able to view your health information using other applications (apps) compatible with our system.

## 2022-08-04 NOTE — DISCHARGE NOTE PROVIDER - CARE PROVIDERS DIRECT ADDRESSES
Called and spoke to patient  Two pt identifiers confirmed  Informed patient that per Dr. Liz Aldana labs are good except for LDL cholelsterol is still elevated and HDL remains low. Advised patient per Dr. Liz Aldana that he can start Pravachol 40mg daily or add Phytomega from [employer]. Patient will discuss options with his wife and let us know which medication he wants to start. Patient verbalized understanding of information discussed  with no further questions at this time. ,[email or web address]_Unknown

## 2022-08-05 DIAGNOSIS — R33.9 RETENTION OF URINE, UNSPECIFIED: ICD-10-CM

## 2022-08-05 LAB
ANION GAP SERPL CALC-SCNC: 12 MMOL/L — SIGNIFICANT CHANGE UP (ref 5–17)
BUN SERPL-MCNC: 10 MG/DL — SIGNIFICANT CHANGE UP (ref 7–23)
CALCIUM SERPL-MCNC: 9.3 MG/DL — SIGNIFICANT CHANGE UP (ref 8.4–10.5)
CHLORIDE SERPL-SCNC: 103 MMOL/L — SIGNIFICANT CHANGE UP (ref 96–108)
CO2 SERPL-SCNC: 25 MMOL/L — SIGNIFICANT CHANGE UP (ref 22–31)
CREAT SERPL-MCNC: 0.59 MG/DL — SIGNIFICANT CHANGE UP (ref 0.5–1.3)
EGFR: 89 ML/MIN/1.73M2 — SIGNIFICANT CHANGE UP
GLUCOSE SERPL-MCNC: 121 MG/DL — HIGH (ref 70–99)
HCT VFR BLD CALC: 27.8 % — LOW (ref 34.5–45)
HGB BLD-MCNC: 9.2 G/DL — LOW (ref 11.5–15.5)
INR BLD: 1.98 RATIO — HIGH (ref 0.88–1.16)
MCHC RBC-ENTMCNC: 33.1 GM/DL — SIGNIFICANT CHANGE UP (ref 32–36)
MCHC RBC-ENTMCNC: 33.5 PG — SIGNIFICANT CHANGE UP (ref 27–34)
MCV RBC AUTO: 101.1 FL — HIGH (ref 80–100)
NRBC # BLD: 0 /100 WBCS — SIGNIFICANT CHANGE UP (ref 0–0)
PLATELET # BLD AUTO: 157 K/UL — SIGNIFICANT CHANGE UP (ref 150–400)
POTASSIUM SERPL-MCNC: 3.9 MMOL/L — SIGNIFICANT CHANGE UP (ref 3.5–5.3)
POTASSIUM SERPL-SCNC: 3.9 MMOL/L — SIGNIFICANT CHANGE UP (ref 3.5–5.3)
PROTHROM AB SERPL-ACNC: 23.1 SEC — HIGH (ref 10.5–13.4)
RBC # BLD: 2.75 M/UL — LOW (ref 3.8–5.2)
RBC # FLD: 14.1 % — SIGNIFICANT CHANGE UP (ref 10.3–14.5)
SODIUM SERPL-SCNC: 140 MMOL/L — SIGNIFICANT CHANGE UP (ref 135–145)
WBC # BLD: 9.2 K/UL — SIGNIFICANT CHANGE UP (ref 3.8–10.5)
WBC # FLD AUTO: 9.2 K/UL — SIGNIFICANT CHANGE UP (ref 3.8–10.5)

## 2022-08-05 RX ORDER — WARFARIN SODIUM 2.5 MG/1
3 TABLET ORAL ONCE
Refills: 0 | Status: COMPLETED | OUTPATIENT
Start: 2022-08-05 | End: 2022-08-05

## 2022-08-05 RX ADMIN — SIMVASTATIN 10 MILLIGRAM(S): 20 TABLET, FILM COATED ORAL at 21:14

## 2022-08-05 RX ADMIN — Medication 975 MILLIGRAM(S): at 12:37

## 2022-08-05 RX ADMIN — WARFARIN SODIUM 3 MILLIGRAM(S): 2.5 TABLET ORAL at 21:15

## 2022-08-05 RX ADMIN — Medication 0.5 MILLIGRAM(S): at 21:15

## 2022-08-05 RX ADMIN — ATENOLOL 50 MILLIGRAM(S): 25 TABLET ORAL at 14:06

## 2022-08-05 RX ADMIN — SENNA PLUS 2 TABLET(S): 8.6 TABLET ORAL at 21:14

## 2022-08-05 RX ADMIN — Medication 0.25 MILLIGRAM(S): at 12:41

## 2022-08-05 RX ADMIN — BUPROPION HYDROCHLORIDE 300 MILLIGRAM(S): 150 TABLET, EXTENDED RELEASE ORAL at 12:38

## 2022-08-05 RX ADMIN — DESVENLAFAXINE 100 MILLIGRAM(S): 50 TABLET, EXTENDED RELEASE ORAL at 13:09

## 2022-08-05 RX ADMIN — QUETIAPINE FUMARATE 150 MILLIGRAM(S): 200 TABLET, FILM COATED ORAL at 18:34

## 2022-08-05 RX ADMIN — Medication 975 MILLIGRAM(S): at 21:45

## 2022-08-05 RX ADMIN — Medication 975 MILLIGRAM(S): at 21:15

## 2022-08-06 VITALS
OXYGEN SATURATION: 98 % | HEART RATE: 99 BPM | TEMPERATURE: 98 F | RESPIRATION RATE: 16 BRPM | SYSTOLIC BLOOD PRESSURE: 118 MMHG | DIASTOLIC BLOOD PRESSURE: 73 MMHG

## 2022-08-06 LAB
INR BLD: 2.46 RATIO — HIGH (ref 0.88–1.16)
PROTHROM AB SERPL-ACNC: 28.5 SEC — HIGH (ref 10.5–13.4)

## 2022-08-06 PROCEDURE — 84295 ASSAY OF SERUM SODIUM: CPT

## 2022-08-06 PROCEDURE — 85027 COMPLETE CBC AUTOMATED: CPT

## 2022-08-06 PROCEDURE — 85025 COMPLETE CBC W/AUTO DIFF WBC: CPT

## 2022-08-06 PROCEDURE — 80048 BASIC METABOLIC PNL TOTAL CA: CPT

## 2022-08-06 PROCEDURE — 85730 THROMBOPLASTIN TIME PARTIAL: CPT

## 2022-08-06 PROCEDURE — U0003: CPT

## 2022-08-06 PROCEDURE — 85018 HEMOGLOBIN: CPT

## 2022-08-06 PROCEDURE — 82330 ASSAY OF CALCIUM: CPT

## 2022-08-06 PROCEDURE — 81001 URINALYSIS AUTO W/SCOPE: CPT

## 2022-08-06 PROCEDURE — 96374 THER/PROPH/DIAG INJ IV PUSH: CPT

## 2022-08-06 PROCEDURE — U0005: CPT

## 2022-08-06 PROCEDURE — 82947 ASSAY GLUCOSE BLOOD QUANT: CPT

## 2022-08-06 PROCEDURE — 97161 PT EVAL LOW COMPLEX 20 MIN: CPT

## 2022-08-06 PROCEDURE — 70450 CT HEAD/BRAIN W/O DYE: CPT | Mod: MA

## 2022-08-06 PROCEDURE — 36415 COLL VENOUS BLD VENIPUNCTURE: CPT

## 2022-08-06 PROCEDURE — C9399: CPT

## 2022-08-06 PROCEDURE — 84132 ASSAY OF SERUM POTASSIUM: CPT

## 2022-08-06 PROCEDURE — 86901 BLOOD TYPING SEROLOGIC RH(D): CPT

## 2022-08-06 PROCEDURE — 97166 OT EVAL MOD COMPLEX 45 MIN: CPT

## 2022-08-06 PROCEDURE — C1776: CPT

## 2022-08-06 PROCEDURE — 72170 X-RAY EXAM OF PELVIS: CPT

## 2022-08-06 PROCEDURE — 97116 GAIT TRAINING THERAPY: CPT

## 2022-08-06 PROCEDURE — 73502 X-RAY EXAM HIP UNI 2-3 VIEWS: CPT

## 2022-08-06 PROCEDURE — 83605 ASSAY OF LACTIC ACID: CPT

## 2022-08-06 PROCEDURE — 82435 ASSAY OF BLOOD CHLORIDE: CPT

## 2022-08-06 PROCEDURE — 73552 X-RAY EXAM OF FEMUR 2/>: CPT

## 2022-08-06 PROCEDURE — 71045 X-RAY EXAM CHEST 1 VIEW: CPT

## 2022-08-06 PROCEDURE — 73560 X-RAY EXAM OF KNEE 1 OR 2: CPT

## 2022-08-06 PROCEDURE — 97110 THERAPEUTIC EXERCISES: CPT

## 2022-08-06 PROCEDURE — 86850 RBC ANTIBODY SCREEN: CPT

## 2022-08-06 PROCEDURE — 97530 THERAPEUTIC ACTIVITIES: CPT

## 2022-08-06 PROCEDURE — 85610 PROTHROMBIN TIME: CPT

## 2022-08-06 PROCEDURE — 86900 BLOOD TYPING SEROLOGIC ABO: CPT

## 2022-08-06 PROCEDURE — 82803 BLOOD GASES ANY COMBINATION: CPT

## 2022-08-06 PROCEDURE — 85014 HEMATOCRIT: CPT

## 2022-08-06 PROCEDURE — 80053 COMPREHEN METABOLIC PANEL: CPT

## 2022-08-06 PROCEDURE — 99285 EMERGENCY DEPT VISIT HI MDM: CPT

## 2022-08-06 RX ORDER — SENNA PLUS 8.6 MG/1
2 TABLET ORAL
Qty: 0 | Refills: 0 | DISCHARGE
Start: 2022-08-06

## 2022-08-06 RX ORDER — TRAMADOL HYDROCHLORIDE 50 MG/1
0.5 TABLET ORAL
Qty: 10 | Refills: 0
Start: 2022-08-06 | End: 2022-08-10

## 2022-08-06 RX ORDER — ACETAMINOPHEN 500 MG
3 TABLET ORAL
Qty: 0 | Refills: 0 | DISCHARGE
Start: 2022-08-06

## 2022-08-06 RX ORDER — TRAMADOL HYDROCHLORIDE 50 MG/1
0.5 TABLET ORAL
Qty: 7.5 | Refills: 0
Start: 2022-08-06 | End: 2022-08-10

## 2022-08-06 RX ADMIN — Medication 10 MILLIGRAM(S): at 06:09

## 2022-08-06 RX ADMIN — Medication 975 MILLIGRAM(S): at 06:40

## 2022-08-06 RX ADMIN — Medication 975 MILLIGRAM(S): at 12:19

## 2022-08-06 RX ADMIN — BUPROPION HYDROCHLORIDE 300 MILLIGRAM(S): 150 TABLET, EXTENDED RELEASE ORAL at 12:18

## 2022-08-06 RX ADMIN — Medication 975 MILLIGRAM(S): at 06:09

## 2022-08-06 RX ADMIN — DESVENLAFAXINE 100 MILLIGRAM(S): 50 TABLET, EXTENDED RELEASE ORAL at 12:30

## 2022-08-06 RX ADMIN — Medication 0.25 MILLIGRAM(S): at 06:11

## 2022-08-06 RX ADMIN — Medication 975 MILLIGRAM(S): at 13:19

## 2022-08-06 RX ADMIN — ATENOLOL 50 MILLIGRAM(S): 25 TABLET ORAL at 06:09

## 2022-08-06 RX ADMIN — Medication 0.25 MILLIGRAM(S): at 12:18

## 2022-08-06 NOTE — PROVIDER CONTACT NOTE (OTHER) - RECOMMENDATIONS
PA notified
Notify provider; harris?
Pt repositioned, put on bedpan. relaxation techniques utilized. PA notified
notify PA
notify provider
notify provider, EKG?
PA notified

## 2022-08-06 NOTE — PROGRESS NOTE ADULT - PROVIDER SPECIALTY LIST ADULT
Orthopedics
Internal Medicine
Orthopedics
Internal Medicine

## 2022-08-06 NOTE — PROGRESS NOTE ADULT - PROBLEM SELECTOR PLAN 2
continue atenolol for rate control  restart coumadin dosing  will continue to monitor rate and adjust meds as needed
continue atenolol for rate control  restart coumadin dosing  will continue to monitor rate and adjust meds as needed
continue atenolol for rate control  INR is theraputic  will continue to monitor rate and adjust meds as needed
continue atenolol for rate control  INR is therapeutic  will continue to monitor rate and adjust meds as needed
continue atenolol for rate control  INR is theraputic  will continue to monitor rate and adjust meds as needed

## 2022-08-06 NOTE — PROGRESS NOTE ADULT - PROBLEM SELECTOR PLAN 6
Patient found to be in urinary retention  TOV in progress   if needed would DC Patient home with a Vo cath  retention may improve with increased ambulation
Patient found to be in urinary retention  failed TOV  DC home with the Vo  retention may improve with increased ambulation  follow up with urology to DC Vo

## 2022-08-06 NOTE — PROVIDER CONTACT NOTE (OTHER) - ACTION/TREATMENT ORDERED:
As per PA, reschedule AM atenolol and torsemide to 0900. Recheck vitals one hour post administration
As per PA, place Vo and collect UA now.
MD made aware, straight cath x1 ordered- will monitor.
As per PA, reassess at midnight, encourage PO fluids. No other interventions at this time
PA notified, pt. will receive straight cath and have one more trial to void as discussed with family. DTV @ 630 (8/6)
as per Trey RODRIGUEZ, give Atenolol that was held this morning.
as per Trey RODRIGUEZ, no new orders at this time, as per Trey, reassess HR in a few minutes.

## 2022-08-06 NOTE — PROGRESS NOTE ADULT - ATTENDING COMMENTS
PT. DVT ppx. f/u labs. DC planning
PT. DVT PPX.  DC planning
PT. DVT ppx. f/u labs. DC planning
PT. DVT ppx. f/u medicine.  DC planning
PT. DVT ppx. f/u labs. DC planning

## 2022-08-06 NOTE — PROGRESS NOTE ADULT - ASSESSMENT
81F presenting w L FN fx after mechanical fall, s/p linda POD#4. Failed TOV at 1AM and straight catheterized.    - Repeat bladder scan 9AM  - WBAT LLE  - Anterior hip precautions   - Pain control  - DVT ppx  - IS  - Dispo: home, pending equipment
83 yo woma with a hx of afib, HTN, IBD presents after a fall at home with a left hip fracture. Patient is s/p an Open reduction and internal fixation of the left hip
A/P: 82yFemale POD1 s/p L hip linda, recovering well  - Pain control  - WBAT LLE  - Anterior precautions  - DVT ppx: Coumadin, venodynes  - PT/OT  - OOB/AAT  - Regular diet  - Monitor I&Os.  - Dispo planning
A/p: Patient is a 82y Female s/p Left Hip Hemiarthroplasty.  VSS. NAD.    
  A/P 82y year old female s/p L hip hemiarthroplasty  Pain Control  DVT PPX  PT/OOB  WBAT   Dispo Planning: home
83 yo woma with a hx of afib, HTN, IBD presents after a fall at home with a left hip fracture. Patient is s/p an Open reduction and internal fixation of the left hip
81 yo woma with a hx of afib, HTN, IBD presents after a fall at home with a left hip fracture. Patient is s/p an Open reduction and internal fixation of the left hip
83 yo woma with a hx of afib, HTN, IBD presents after a fall at home with a left hip fracture. Patient is s/p an Open reduction and internal fixation of the left hip
81 yo woma with a hx of afib, HTN, IBD presents after a fall at home with a left hip fracture. Patient is s/p an Open reduction and internal fixation of the left hip

## 2022-08-06 NOTE — PROVIDER CONTACT NOTE (OTHER) - REASON
95/64, due for atenolol and torsemide
95/58, 102/74
pt retaining 950ml on bladder scan
Urinary retention
 after working w/ PT
pt  after rechecking
urinary retention

## 2022-08-06 NOTE — PROGRESS NOTE ADULT - PROBLEM SELECTOR PLAN 5
continue buproprion, Seroquel, and clonazepam  Psych eval as needed

## 2022-08-06 NOTE — PROVIDER CONTACT NOTE (OTHER) - ASSESSMENT
Pt stable, in no apparent distress. Pt's harris pulled during the day and pt is now DTV- however pt is unable to void. Bladder scan showed 474 ml
pt resting in chair, no signs of distress, denies any chest pain
pt. bladder scan 550, gardenia steady utilized to get patient to bathroom, but failed to void
aox2, 95/64. pt has no c/o chest pain, SOB, dizziness. Pt otherwise asymptomatic
pt resting in chair, no signs of distress
aox2, family @bedside, 95/58 on machine, 102/74 manually. pt has no c/o chest pain, SOB, dizziness. Pt otherwise asymptomatic
aOx1, pt stated " I have to urinate but I can't" lower abdomen tender to palpation. Bladder scan shows 950mL.

## 2022-08-06 NOTE — PROGRESS NOTE ADULT - PROBLEM SELECTOR PLAN 3
continue to monitor BP   continue atenolol and torsemide  will adjust meds as needed

## 2022-08-06 NOTE — PROGRESS NOTE ADULT - SUBJECTIVE AND OBJECTIVE BOX
82 year old female with history of AFib on anticoagulation with Warfarin comes into the ED for left hip and knee pain after a fall mechanical fall last night. She was walking around her bed she lost her footing and fell onto her buttocks. The fall was witnessed by her  who states she did not hit her head and there was no LOC. She states she remembers everything before, during, and after the fall. She did not have any dizziness, presyncopal episodes, or palpitations prior to the fall. Her daughter heard the fall and came into the room to find the Pt in a seated position on the floor. The daughter helped her back into the bed. The Pt states she had some left leg pain but she did not want to come into the ED. This morning she was unable to stand or walk secondary to pain so she was brought into the ED via EMS. In the ED the apt was found to have a left hip fracture and is s/p an Open reduction and internal fixation of the left hip. Patient Pain better controlled.     MEDICATIONS  (STANDING):  acetaminophen     Tablet .. 975 milliGRAM(s) Oral every 8 hours  ATENolol  Tablet 50 milliGRAM(s) Oral daily  buPROPion XL (24-Hour) . 300 milliGRAM(s) Oral daily  clonazePAM  Tablet 0.25 milliGRAM(s) Oral <User Schedule>  clonazePAM  Tablet 0.5 milliGRAM(s) Oral at bedtime  desvenlafaxine  milliGRAM(s) Oral daily  QUEtiapine 150 milliGRAM(s) Oral daily  senna 2 Tablet(s) Oral at bedtime  simvastatin 10 milliGRAM(s) Oral at bedtime  torsemide 10 milliGRAM(s) Oral daily  warfarin 2.5 milliGRAM(s) Oral once    MEDICATIONS  (PRN):  HYDROmorphone  Injectable 0.5 milliGRAM(s) IV Push every 6 hours PRN Severe Pain (7 - 10)  magnesium hydroxide Suspension 30 milliLiter(s) Oral daily PRN Constipation  melatonin 3 milliGRAM(s) Oral at bedtime PRN Insomnia  oxyCODONE    IR 2.5 milliGRAM(s) Oral every 4 hours PRN Moderate Pain (4 - 6)  oxyCODONE    IR 5 milliGRAM(s) Oral every 4 hours PRN Severe Pain (7 - 10)  traMADol 25 milliGRAM(s) Oral every 8 hours PRN Mild Pain (1 - 3)          VITALS:   T(C): 36.9 (22 @ 13:39), Max: 37 (22 @ 20:14)  HR: 106 (22 @ 13:39) (96 - 108)  BP: 92/64 (22 @ 13:39) (92/64 - 114/73)  RR: 18 (22 @ 13:39) (17 - 18)  SpO2: 96% (22 @ 13:39) (93% - 98%)  Wt(kg): --    PHYSICAL EXAM:  GENERAL: NAD, well-groomed, well-developed  HEAD:  Atraumatic, Normocephalic  EYES: EOMI, PERRLA, conjunctiva and sclera clear  ENMT: No tonsillar erythema, exudates, or enlargement; Moist mucous membranes, Good dentition, No lesions  NECK: Supple, No JVD, Normal thyroid  NERVOUS SYSTEM:  Alert & Oriented X3, Good concentration; Motor Strength 5/5 B/L upper and lower extremities; DTRs 2+ intact and symmetric  CHEST/LUNG: Clear to percussion bilaterally; No rales, rhonchi, wheezing, or rubs  HEART: Regular rate and rhythm; No murmurs, rubs, or gallops  ABDOMEN: mild tenderness, Nondistended; Bowel sounds present  EXTREMITIES:  No C/C/E  LYMPH: No lymphadenopathy noted  SKIN: No rashes or lesions    LABS:        CBC Full  -  ( 03 Aug 2022 12:50 )  WBC Count : 9.65 K/uL  RBC Count : 2.81 M/uL  Hemoglobin : 9.3 g/dL  Hematocrit : 28.7 %  Platelet Count - Automated : 127 K/uL  Mean Cell Volume : 102.1 fl  Mean Cell Hemoglobin : 33.1 pg  Mean Cell Hemoglobin Concentration : 32.4 gm/dL  Auto Neutrophil # : x  Auto Lymphocyte # : x  Auto Monocyte # : x  Auto Eosinophil # : x  Auto Basophil # : x  Auto Neutrophil % : x  Auto Lymphocyte % : x  Auto Monocyte % : x  Auto Eosinophil % : x  Auto Basophil % : x        143  |  108  |  10  ----------------------------<  142<H>  4.2   |  24  |  0.63    Ca    9.0      02 Aug 2022 07:15    TPro  6.7  /  Alb  3.8  /  TBili  0.5  /  DBili  x   /  AST  33  /  ALT  32  /  AlkPhos  138<H>  08-01    LIVER FUNCTIONS - ( 01 Aug 2022 21:25 )  Alb: 3.8 g/dL / Pro: 6.7 g/dL / ALK PHOS: 138 U/L / ALT: 32 U/L / AST: 33 U/L / GGT: x           PT/INR - ( 03 Aug 2022 09:27 )   PT: 27.1 sec;   INR: 2.32 ratio         PTT - ( 02 Aug 2022 07:14 )  PTT:29.3 sec  Urinalysis Basic - ( 03 Aug 2022 07:34 )    Color: Light Yellow / Appearance: Clear / S.023 / pH: x  Gluc: x / Ketone: Trace  / Bili: Negative / Urobili: Negative   Blood: x / Protein: Trace / Nitrite: Negative   Leuk Esterase: Negative / RBC: 0 /hpf / WBC 1 /HPF   Sq Epi: x / Non Sq Epi: 0 /hpf / Bacteria: 0.0      CAPILLARY BLOOD GLUCOSE          RADIOLOGY & ADDITIONAL TESTS:      
82 year old female with history of AFib on anticoagulation with Warfarin comes into the ED for left hip and knee pain after a fall mechanical fall last night. She was walking around her bed she lost her footing and fell onto her buttocks. The fall was witnessed by her  who states she did not hit her head and there was no LOC. She states she remembers everything before, during, and after the fall. She did not have any dizziness, presyncopal episodes, or palpitations prior to the fall. Her daughter heard the fall and came into the room to find the Pt in a seated position on the floor. The daughter helped her back into the bed. The Pt states she had some left leg pain but she did not want to come into the ED. This morning she was unable to stand or walk secondary to pain so she was brought into the ED via EMS. In the ED the apt was found to have a left hip fracture and is s/p an Open reduction and internal fixation of the left hip. Patient Pain better controlled. Patient has started working with physical therapy. Plan at this time is for Patient to be DCed home with home physical therapy     MEDICATIONS  (STANDING):  acetaminophen     Tablet .. 975 milliGRAM(s) Oral every 8 hours  ATENolol  Tablet 50 milliGRAM(s) Oral daily  buPROPion XL (24-Hour) . 300 milliGRAM(s) Oral daily  clonazePAM  Tablet 0.25 milliGRAM(s) Oral <User Schedule>  clonazePAM  Tablet 0.5 milliGRAM(s) Oral at bedtime  desvenlafaxine  milliGRAM(s) Oral daily  QUEtiapine 150 milliGRAM(s) Oral daily  senna 2 Tablet(s) Oral at bedtime  simvastatin 10 milliGRAM(s) Oral at bedtime  torsemide 10 milliGRAM(s) Oral daily  warfarin 2.5 milliGRAM(s) Oral once    MEDICATIONS  (PRN):  HYDROmorphone  Injectable 0.5 milliGRAM(s) IV Push every 6 hours PRN Severe Pain (7 - 10)  lactulose Syrup 20 Gram(s) Oral three times a day PRN Constipation  magnesium hydroxide Suspension 30 milliLiter(s) Oral daily PRN Constipation  melatonin 3 milliGRAM(s) Oral at bedtime PRN Insomnia  oxyCODONE    IR 2.5 milliGRAM(s) Oral every 4 hours PRN Moderate Pain (4 - 6)  oxyCODONE    IR 5 milliGRAM(s) Oral every 4 hours PRN Severe Pain (7 - 10)  traMADol 25 milliGRAM(s) Oral every 8 hours PRN Mild Pain (1 - 3)          VITALS:   T(C): 36.7 (22 @ 09:20), Max: 37 (22 @ 20:12)  HR: 78 (22 @ 12:10) (78 - 112)  BP: 114/83 (22 @ 12:10) (92/64 - 114/83)  RR: 18 (22 @ 09:20) (18 - 18)  SpO2: 99% (22 @ 09:20) (96% - 99%)  Wt(kg): --    PHYSICAL EXAM:  GENERAL: NAD, well-groomed, well-developed  HEAD:  Atraumatic, Normocephalic  EYES: EOMI, PERRLA, conjunctiva and sclera clear  ENMT: No tonsillar erythema, exudates, or enlargement; Moist mucous membranes, Good dentition, No lesions  NECK: Supple, No JVD, Normal thyroid  NERVOUS SYSTEM:  Alert & Oriented X3, Good concentration; Motor Strength 5/5 B/L upper and lower extremities; DTRs 2+ intact and symmetric  CHEST/LUNG: Clear to percussion bilaterally; No rales, rhonchi, wheezing, or rubs  HEART: Regular rate and rhythm; No murmurs, rubs, or gallops  ABDOMEN: mild tenderness, Nondistended; Bowel sounds present  EXTREMITIES:  No C/C/E  LYMPH: No lymphadenopathy noted  SKIN: No rashes or lesions    LABS:        CBC Full  -  ( 03 Aug 2022 12:50 )  WBC Count : 9.65 K/uL  RBC Count : 2.81 M/uL  Hemoglobin : 9.3 g/dL  Hematocrit : 28.7 %  Platelet Count - Automated : 127 K/uL  Mean Cell Volume : 102.1 fl  Mean Cell Hemoglobin : 33.1 pg  Mean Cell Hemoglobin Concentration : 32.4 gm/dL  Auto Neutrophil # : x  Auto Lymphocyte # : x  Auto Monocyte # : x  Auto Eosinophil # : x  Auto Basophil # : x  Auto Neutrophil % : x  Auto Lymphocyte % : x  Auto Monocyte % : x  Auto Eosinophil % : x  Auto Basophil % : x            PT/INR - ( 04 Aug 2022 05:11 )   PT: 26.0 sec;   INR: 2.22 ratio           Urinalysis Basic - ( 03 Aug 2022 07:34 )    Color: Light Yellow / Appearance: Clear / S.023 / pH: x  Gluc: x / Ketone: Trace  / Bili: Negative / Urobili: Negative   Blood: x / Protein: Trace / Nitrite: Negative   Leuk Esterase: Negative / RBC: 0 /hpf / WBC 1 /HPF   Sq Epi: x / Non Sq Epi: 0 /hpf / Bacteria: 0.0      CAPILLARY BLOOD GLUCOSE          RADIOLOGY & ADDITIONAL TESTS:      
82 year old female with history of AFib on anticoagulation with Warfarin comes into the ED for left hip and knee pain after a fall mechanical fall last night. She was walking around her bed she lost her footing and fell onto her buttocks. The fall was witnessed by her  who states she did not hit her head and there was no LOC. She states she remembers everything before, during, and after the fall. She did not have any dizziness, presyncopal episodes, or palpitations prior to the fall. Her daughter heard the fall and came into the room to find the Pt in a seated position on the floor. The daughter helped her back into the bed. The Pt states she had some left leg pain but she did not want to come into the ED. This morning she was unable to stand or walk secondary to pain so she was brought into the ED via EMS. In the ED the apt was found to have a left hip fracture and is s/p an Open reduction and internal fixation of the left hip. Patient Pain better controlled. Patient has started working with physical therapy. Plan at this time is for Patient to be DCed home with home physical therapy. Patient found to be in urinary retention.  Patient failed TOV. Patient is scheduled for DC home with the steven.      MEDICATIONS  (STANDING):  acetaminophen     Tablet .. 975 milliGRAM(s) Oral every 8 hours  ATENolol  Tablet 50 milliGRAM(s) Oral daily  buPROPion XL (24-Hour) . 300 milliGRAM(s) Oral daily  clonazePAM  Tablet 0.25 milliGRAM(s) Oral <User Schedule>  clonazePAM  Tablet 0.5 milliGRAM(s) Oral at bedtime  desvenlafaxine  milliGRAM(s) Oral daily  QUEtiapine 150 milliGRAM(s) Oral daily  senna 2 Tablet(s) Oral at bedtime  simvastatin 10 milliGRAM(s) Oral at bedtime  torsemide 10 milliGRAM(s) Oral daily    MEDICATIONS  (PRN):  dicyclomine 10 milliGRAM(s) Oral three times a day before meals PRN IBS  HYDROmorphone  Injectable 0.5 milliGRAM(s) IV Push every 6 hours PRN Severe Pain (7 - 10)  lactulose Syrup 20 Gram(s) Oral three times a day PRN Constipation  magnesium hydroxide Suspension 30 milliLiter(s) Oral daily PRN Constipation  melatonin 3 milliGRAM(s) Oral at bedtime PRN Insomnia  oxyCODONE    IR 2.5 milliGRAM(s) Oral every 4 hours PRN Moderate Pain (4 - 6)  oxyCODONE    IR 5 milliGRAM(s) Oral every 4 hours PRN Severe Pain (7 - 10)  traMADol 25 milliGRAM(s) Oral every 8 hours PRN Mild Pain (1 - 3)          VITALS:   T(C): 36.9 (08-06-22 @ 12:00), Max: 37 (08-05-22 @ 20:53)  HR: 99 (08-06-22 @ 12:00) (82 - 129)  BP: 118/73 (08-06-22 @ 12:00) (106/69 - 130/87)  RR: 16 (08-06-22 @ 12:00) (16 - 16)  SpO2: 98% (08-06-22 @ 12:00) (94% - 98%)  Wt(kg): --    PHYSICAL EXAM:  GENERAL: NAD, well-groomed, well-developed  HEAD:  Atraumatic, Normocephalic  EYES: EOMI, PERRLA, conjunctiva and sclera clear  ENMT: No tonsillar erythema, exudates, or enlargement; Moist mucous membranes, Good dentition, No lesions  NECK: Supple, No JVD, Normal thyroid  NERVOUS SYSTEM:  Alert & Oriented X3, Good concentration; Motor Strength 5/5 B/L upper and lower extremities; DTRs 2+ intact and symmetric  CHEST/LUNG: Clear to percussion bilaterally; No rales, rhonchi, wheezing, or rubs  HEART: Regular rate and rhythm; No murmurs, rubs, or gallops  ABDOMEN: mild tenderness, Nondistended; Bowel sounds present  EXTREMITIES:  No C/C/E  LYMPH: No lymphadenopathy noted  SKIN: No rashes or lesions    LABS:        CBC Full  -  ( 05 Aug 2022 07:21 )  WBC Count : 9.20 K/uL  RBC Count : 2.75 M/uL  Hemoglobin : 9.2 g/dL  Hematocrit : 27.8 %  Platelet Count - Automated : 157 K/uL  Mean Cell Volume : 101.1 fl  Mean Cell Hemoglobin : 33.5 pg  Mean Cell Hemoglobin Concentration : 33.1 gm/dL  Auto Neutrophil # : x  Auto Lymphocyte # : x  Auto Monocyte # : x  Auto Eosinophil # : x  Auto Basophil # : x  Auto Neutrophil % : x  Auto Lymphocyte % : x  Auto Monocyte % : x  Auto Eosinophil % : x  Auto Basophil % : x    08-05    140  |  103  |  10  ----------------------------<  121<H>  3.9   |  25  |  0.59    Ca    9.3      05 Aug 2022 07:05        PT/INR - ( 06 Aug 2022 10:58 )   PT: 28.5 sec;   INR: 2.46 ratio             CAPILLARY BLOOD GLUCOSE          RADIOLOGY & ADDITIONAL TESTS:      
ORTHO ATTENDING POST OP    s/p  L hip hemiarthroplasty  WBAT L  LE  Anterior hip precautions  RE Start coumadin tomorrow  venodynes  ancef x 24h  OOB to chair in AM  rehab consult  CBC, coags in RR and AM   f/u medicine  
Pt seen/examined. No complaints overnight. Pain is well controlled. Now off 2L NC, satting at 89-90%, but denies any SOB or chest pain. Denies N/V, fevers, chills.       Physical Exam:    T(C): 36.9 (08-04-22 @ 04:53), Max: 37 (08-03-22 @ 20:12)  HR: 101 (08-04-22 @ 04:53) (101 - 112)  BP: 100/68 (08-04-22 @ 04:53) (92/64 - 114/73)  RR: 18 (08-04-22 @ 04:53) (18 - 18)  SpO2: 96% (08-04-22 @ 04:53) (93% - 99%)  Wt(kg): --  - Gen: NAD  - LLE: dressing C/D/I, SILT S/S/T/SP/DP, Fires EHL/FHL/GS/TA, compartments soft, DP pulse palpable    Labs:                          9.3    9.65  )-----------( 127      ( 03 Aug 2022 12:50 )             28.7                         10.6   10.10 )-----------( 113      ( 02 Aug 2022 07:13 )             33.0       08-02    143  |  108  |  10  ----------------------------<  142<H>  4.2   |  24  |  0.63        PT/INR - ( 04 Aug 2022 05:11 )   PT: 26.0 sec;   INR: 2.22 ratio         PTT - ( 02 Aug 2022 07:14 )  PTT:29.3 sec      Assessment/Plan:    82yFemale s/p L hip hemiarthroplasty     - Pain control  - Appreciate IM recs  - DVT ppx: Coumadin + Venodynes  - OOB/PT  - WBAT in LLE  - Plan to d/c home with wheelchair
ORTHOPAEDICS DAILY PROGRESS NOTE:       SUBJECTIVE/ROS: POD 1. Seen and examined. Pain well controlled. Needs to work with PT. Denies CP/SOB/N/V.         MEDICATIONS  (STANDING):  acetaminophen     Tablet .. 975 milliGRAM(s) Oral every 8 hours  ATENolol  Tablet 50 milliGRAM(s) Oral daily  buPROPion XL (24-Hour) . 300 milliGRAM(s) Oral daily  ceFAZolin   IVPB 2000 milliGRAM(s) IV Intermittent every 8 hours  clonazePAM  Tablet 0.5 milliGRAM(s) Oral at bedtime  clonazePAM  Tablet 0.25 milliGRAM(s) Oral two times a day  QUEtiapine 150 milliGRAM(s) Oral daily  QUEtiapine XR 50 milliGRAM(s) Oral at bedtime  senna 2 Tablet(s) Oral at bedtime  simvastatin 10 milliGRAM(s) Oral at bedtime  sodium chloride 0.9%. 1000 milliLiter(s) (125 mL/Hr) IV Continuous <Continuous>  sodium chloride 0.9%. 1000 milliLiter(s) (100 mL/Hr) IV Continuous <Continuous>  torsemide 10 milliGRAM(s) Oral daily    MEDICATIONS  (PRN):  HYDROmorphone  Injectable 0.5 milliGRAM(s) IV Push every 6 hours PRN Severe Pain (7 - 10)  magnesium hydroxide Suspension 30 milliLiter(s) Oral daily PRN Constipation  melatonin 3 milliGRAM(s) Oral at bedtime PRN Insomnia  oxyCODONE    IR 2.5 milliGRAM(s) Oral every 4 hours PRN Moderate Pain (4 - 6)  oxyCODONE    IR 5 milliGRAM(s) Oral every 4 hours PRN Severe Pain (7 - 10)  traMADol 25 milliGRAM(s) Oral every 8 hours PRN Mild Pain (1 - 3)      OBJECTIVE:    Vital Signs Last 24 Hrs  T(C): 36.6 (02 Aug 2022 04:57), Max: 36.9 (01 Aug 2022 18:00)  T(F): 97.9 (02 Aug 2022 04:57), Max: 98.4 (01 Aug 2022 18:00)  HR: 86 (02 Aug 2022 04:57) (84 - 108)  BP: 107/68 (02 Aug 2022 04:57) (100/58 - 155/97)  BP(mean): 81 (01 Aug 2022 23:00) (75 - 90)  RR: 18 (02 Aug 2022 04:57) (14 - 20)  SpO2: 93% (02 Aug 2022 04:57) (92% - 99%)    Parameters below as of 02 Aug 2022 04:57  Patient On (Oxygen Delivery Method): nasal cannula  O2 Flow (L/min): 2      I&O's Detail    01 Aug 2022 07:01  -  02 Aug 2022 06:17  --------------------------------------------------------  IN:    Oral Fluid: 40 mL    sodium chloride 0.9%: 250 mL  Total IN: 290 mL    OUT:  Total OUT: 0 mL    Total NET: 290 mL          Daily Height in cm: 149.86 (01 Aug 2022 18:24)    Daily     LABS:                        12.4   10.59 )-----------( 128      ( 01 Aug 2022 21:25 )             37.2     08-01    140  |  106  |  11  ----------------------------<  138<H>  3.9   |  24  |  0.75    Ca    8.7      01 Aug 2022 21:25    TPro  6.7  /  Alb  3.8  /  TBili  0.5  /  DBili  x   /  AST  33  /  ALT  32  /  AlkPhos  138<H>  08-01    PT/INR - ( 01 Aug 2022 21:25 )   PT: 21.7 sec;   INR: 1.86 ratio         PTT - ( 01 Aug 2022 21:25 )  PTT:28.7 sec              PHYSICAL EXAM:  nad  nonlabored resp  lle  dressing c/d/i  +gastroc/ta/ehl/fhl  silt s/s/sp/dp/t  +dp  
Orthopedic Surgery Progress Note     OVN: Pt found to be retaining urine on TOV, 474cc. Pt straight-cath'd at 1AM, has not voided since. Pt denies suprapubic discomfort/other urinary symptoms.     S: Patient seen and examined today. Pain is well controlled. Denies f/c, chest pain, shortness of breath, dizziness.      MEDICATIONS  (STANDING):  acetaminophen     Tablet .. 975 milliGRAM(s) Oral every 8 hours  ATENolol  Tablet 50 milliGRAM(s) Oral daily  buPROPion XL (24-Hour) . 300 milliGRAM(s) Oral daily  clonazePAM  Tablet 0.25 milliGRAM(s) Oral <User Schedule>  clonazePAM  Tablet 0.5 milliGRAM(s) Oral at bedtime  desvenlafaxine  milliGRAM(s) Oral daily  QUEtiapine 150 milliGRAM(s) Oral daily  senna 2 Tablet(s) Oral at bedtime  simvastatin 10 milliGRAM(s) Oral at bedtime  torsemide 10 milliGRAM(s) Oral daily    MEDICATIONS  (PRN):  dicyclomine 10 milliGRAM(s) Oral three times a day before meals PRN IBS  HYDROmorphone  Injectable 0.5 milliGRAM(s) IV Push every 6 hours PRN Severe Pain (7 - 10)  lactulose Syrup 20 Gram(s) Oral three times a day PRN Constipation  magnesium hydroxide Suspension 30 milliLiter(s) Oral daily PRN Constipation  melatonin 3 milliGRAM(s) Oral at bedtime PRN Insomnia  oxyCODONE    IR 2.5 milliGRAM(s) Oral every 4 hours PRN Moderate Pain (4 - 6)  oxyCODONE    IR 5 milliGRAM(s) Oral every 4 hours PRN Severe Pain (7 - 10)  traMADol 25 milliGRAM(s) Oral every 8 hours PRN Mild Pain (1 - 3)      Physical Exam:  - NAD, asleep in bed  - No increased WOB, sating 92 on RA  - No suprapubic discomfort    LLE  - Incision c/d/i  - WWP  - NTP calves b/l  - SILT, strength 5/5    Vital Signs Last 24 Hrs  T(C): 37.3 (05 Aug 2022 05:16), Max: 37.3 (05 Aug 2022 05:16)  T(F): 99.2 (05 Aug 2022 05:16), Max: 99.2 (05 Aug 2022 05:16)  HR: 103 (05 Aug 2022 05:16) (78 - 112)  BP: 96/66 (05 Aug 2022 05:16) (92/60 - 115/75)  BP(mean): --  RR: 16 (05 Aug 2022 05:16) (16 - 18)  SpO2: 93% (05 Aug 2022 05:16) (93% - 99%)    Parameters below as of 05 Aug 2022 05:16  Patient On (Oxygen Delivery Method): room air        08-03-22 @ 07:01  -  08-04-22 @ 07:00  --------------------------------------------------------  IN: 460 mL / OUT: 1400 mL / NET: -940 mL    08-04-22 @ 07:01  -  08-05-22 @ 06:14  --------------------------------------------------------  IN: 820 mL / OUT: 1450 mL / NET: -630 mL                    LABS:                        8.8    9.24  )-----------( 145      ( 04 Aug 2022 15:09 )             26.6     08-04    137  |  101  |  11  ----------------------------<  169<H>  3.2<L>   |  27  |  0.57    Ca    9.2      04 Aug 2022 15:08    
Orthopedic Surgery Progress Note    Patient seen and examined this morning. No acute events overnight. Pain is well controlled. Denies f/c, chest pain, sob, dizziness.    O:  Vital Signs Last 24 Hrs  T(C): 36.4 (03 Aug 2022 05:07), Max: 37 (02 Aug 2022 20:14)  T(F): 97.6 (03 Aug 2022 05:07), Max: 98.6 (02 Aug 2022 20:14)  HR: 96 (03 Aug 2022 05:07) (70 - 108)  BP: 95/64 (03 Aug 2022 05:07) (91/51 - 103/67)  BP(mean): --  RR: 17 (03 Aug 2022 05:07) (17 - 18)  SpO2: 98% (03 Aug 2022 05:07) (93% - 98%)    Parameters below as of 03 Aug 2022 05:07  Patient On (Oxygen Delivery Method): nasal cannula  O2 Flow (L/min): 2      Gen: NAD  LLE  Dressing C/D/I  EHL/FHL/TA/GS intact  SILT DP/SP/SHANKS/Sa  WWP distally    Labs:                        10.6   10.10 )-----------( 113      ( 02 Aug 2022 07:13 )             33.0                         12.4   10.59 )-----------( 128      ( 01 Aug 2022 21:25 )             37.2       08-02    143  |  108  |  10  ----------------------------<  142<H>  4.2   |  24  |  0.63        PT/INR - ( 02 Aug 2022 07:14 )   PT: 27.2 sec;   INR: 2.32 ratio         PTT - ( 02 Aug 2022 07:14 )  PTT:29.3 sec      
Post op Day 5    Patient seen at bedside, nervous about going home with harris catheter.  No chest pain, SOB, N/V. Has been unable to void since last night.    T(C): 37 (08-06-22 @ 04:21), Max: 37.6 (08-05-22 @ 08:24)  HR: 100 (08-06-22 @ 04:21) (82 - 129)  BP: 110/71 (08-06-22 @ 04:21) (110/71 - 130/87)  RR: 16 (08-06-22 @ 04:21) (16 - 16)  SpO2: 97% (08-06-22 @ 04:21) (93% - 97%)  Wt(kg): --    Exam:  Alert and Oriented, No Acute Distress  Lower Extremities: Left hip aquacel c/d/i  Calves Soft, Non-tender bilaterally  +PF/DF/EHL/FHL  SILT  +DP Pulse                          9.2    9.20  )-----------( 157      ( 05 Aug 2022 07:21 )             27.8    08-05    140  |  103  |  10  ----------------------------<  121<H>  3.9   |  25  |  0.59    Ca    9.3      05 Aug 2022 07:05        
82 year old female with history of AFib on anticoagulation with Warfarin comes into the ED for left hip and knee pain after a fall mechanical fall last night. She was walking around her bed she lost her footing and fell onto her buttocks. The fall was witnessed by her  who states she did not hit her head and there was no LOC. She states she remembers everything before, during, and after the fall. She did not have any dizziness, presyncopal episodes, or palpitations prior to the fall. Her daughter heard the fall and came into the room to find the Pt in a seated position on the floor. The daughter helped her back into the bed. The Pt states she had some left leg pain but she did not want to come into the ED. This morning she was unable to stand or walk secondary to pain so she was brought into the ED via EMS. In the ED the apt was found to have a left hip fracture and is s/p an Open reduction and internal fixation of the left hip. Patient tolerated the procedure well. complaining of pain at the surgical site      MEDICATIONS  (STANDING):  acetaminophen     Tablet .. 975 milliGRAM(s) Oral every 8 hours  ATENolol  Tablet 50 milliGRAM(s) Oral daily  buPROPion XL (24-Hour) . 300 milliGRAM(s) Oral daily  clonazePAM  Tablet 0.25 milliGRAM(s) Oral <User Schedule>  clonazePAM  Tablet 0.5 milliGRAM(s) Oral at bedtime  QUEtiapine 150 milliGRAM(s) Oral daily  senna 2 Tablet(s) Oral at bedtime  simvastatin 10 milliGRAM(s) Oral at bedtime  torsemide 10 milliGRAM(s) Oral daily  warfarin 2.5 milliGRAM(s) Oral once    MEDICATIONS  (PRN):  HYDROmorphone  Injectable 0.5 milliGRAM(s) IV Push every 6 hours PRN Severe Pain (7 - 10)  magnesium hydroxide Suspension 30 milliLiter(s) Oral daily PRN Constipation  melatonin 3 milliGRAM(s) Oral at bedtime PRN Insomnia  oxyCODONE    IR 2.5 milliGRAM(s) Oral every 4 hours PRN Moderate Pain (4 - 6)  oxyCODONE    IR 5 milliGRAM(s) Oral every 4 hours PRN Severe Pain (7 - 10)  traMADol 25 milliGRAM(s) Oral every 8 hours PRN Mild Pain (1 - 3)          VITALS:   T(C): 36.4 (08-02-22 @ 09:14), Max: 36.9 (08-01-22 @ 18:00)  HR: 88 (08-02-22 @ 09:14) (84 - 108)  BP: 91/51 (08-02-22 @ 10:40) (91/51 - 155/97)  RR: 18 (08-02-22 @ 09:14) (14 - 20)  SpO2: 93% (08-02-22 @ 10:40) (92% - 99%)  Wt(kg): --      PHYSICAL EXAM:  GENERAL: NAD, well-groomed, well-developed  HEAD:  Atraumatic, Normocephalic  EYES: EOMI, PERRLA, conjunctiva and sclera clear  ENMT: No tonsillar erythema, exudates, or enlargement; Moist mucous membranes, Good dentition, No lesions  NECK: Supple, No JVD, Normal thyroid  NERVOUS SYSTEM:  Alert & Oriented X3, Good concentration; Motor Strength 5/5 B/L upper and lower extremities; DTRs 2+ intact and symmetric  CHEST/LUNG: Clear to percussion bilaterally; No rales, rhonchi, wheezing, or rubs  HEART: Regular rate and rhythm; No murmurs, rubs, or gallops  ABDOMEN: mild tenderness, Nondistended; Bowel sounds present  EXTREMITIES:  No C/C/E  LYMPH: No lymphadenopathy noted  SKIN: No rashes or lesions    LABS:        CBC Full  -  ( 02 Aug 2022 07:13 )  WBC Count : 10.10 K/uL  RBC Count : 3.19 M/uL  Hemoglobin : 10.6 g/dL  Hematocrit : 33.0 %  Platelet Count - Automated : 113 K/uL  Mean Cell Volume : 103.4 fl  Mean Cell Hemoglobin : 33.2 pg  Mean Cell Hemoglobin Concentration : 32.1 gm/dL  Auto Neutrophil # : 8.12 K/uL  Auto Lymphocyte # : 1.26 K/uL  Auto Monocyte # : 0.65 K/uL  Auto Eosinophil # : 0.00 K/uL  Auto Basophil # : 0.01 K/uL  Auto Neutrophil % : 80.4 %  Auto Lymphocyte % : 12.5 %  Auto Monocyte % : 6.4 %  Auto Eosinophil % : 0.0 %  Auto Basophil % : 0.1 %    08-02    143  |  108  |  10  ----------------------------<  142<H>  4.2   |  24  |  0.63    Ca    9.0      02 Aug 2022 07:15    TPro  6.7  /  Alb  3.8  /  TBili  0.5  /  DBili  x   /  AST  33  /  ALT  32  /  AlkPhos  138<H>  08-01    LIVER FUNCTIONS - ( 01 Aug 2022 21:25 )  Alb: 3.8 g/dL / Pro: 6.7 g/dL / ALK PHOS: 138 U/L / ALT: 32 U/L / AST: 33 U/L / GGT: x           PT/INR - ( 02 Aug 2022 07:14 )   PT: 27.2 sec;   INR: 2.32 ratio         PTT - ( 02 Aug 2022 07:14 )  PTT:29.3 sec    CAPILLARY BLOOD GLUCOSE          RADIOLOGY & ADDITIONAL TESTS:      
82 year old female with history of AFib on anticoagulation with Warfarin comes into the ED for left hip and knee pain after a fall mechanical fall last night. She was walking around her bed she lost her footing and fell onto her buttocks. The fall was witnessed by her  who states she did not hit her head and there was no LOC. She states she remembers everything before, during, and after the fall. She did not have any dizziness, presyncopal episodes, or palpitations prior to the fall. Her daughter heard the fall and came into the room to find the Pt in a seated position on the floor. The daughter helped her back into the bed. The Pt states she had some left leg pain but she did not want to come into the ED. This morning she was unable to stand or walk secondary to pain so she was brought into the ED via EMS. In the ED the apt was found to have a left hip fracture and is s/p an Open reduction and internal fixation of the left hip. Patient Pain better controlled. Patient has started working with physical therapy. Plan at this time is for Patient to be DCed home with home physical therapy. Patient found to be in urinary retention. TOV in progress       MEDICATIONS  (STANDING):  acetaminophen     Tablet .. 975 milliGRAM(s) Oral every 8 hours  ATENolol  Tablet 50 milliGRAM(s) Oral daily  buPROPion XL (24-Hour) . 300 milliGRAM(s) Oral daily  clonazePAM  Tablet 0.25 milliGRAM(s) Oral <User Schedule>  clonazePAM  Tablet 0.5 milliGRAM(s) Oral at bedtime  desvenlafaxine  milliGRAM(s) Oral daily  QUEtiapine 150 milliGRAM(s) Oral daily  senna 2 Tablet(s) Oral at bedtime  simvastatin 10 milliGRAM(s) Oral at bedtime  torsemide 10 milliGRAM(s) Oral daily  warfarin 3 milliGRAM(s) Oral once    MEDICATIONS  (PRN):  dicyclomine 10 milliGRAM(s) Oral three times a day before meals PRN IBS  HYDROmorphone  Injectable 0.5 milliGRAM(s) IV Push every 6 hours PRN Severe Pain (7 - 10)  lactulose Syrup 20 Gram(s) Oral three times a day PRN Constipation  magnesium hydroxide Suspension 30 milliLiter(s) Oral daily PRN Constipation  melatonin 3 milliGRAM(s) Oral at bedtime PRN Insomnia  oxyCODONE    IR 2.5 milliGRAM(s) Oral every 4 hours PRN Moderate Pain (4 - 6)  oxyCODONE    IR 5 milliGRAM(s) Oral every 4 hours PRN Severe Pain (7 - 10)  traMADol 25 milliGRAM(s) Oral every 8 hours PRN Mild Pain (1 - 3)          VITALS:   T(C): 37.6 (08-05-22 @ 12:35), Max: 37.6 (08-05-22 @ 08:24)  HR: 129 (08-05-22 @ 14:02) (100 - 129)  BP: 116/64 (08-05-22 @ 13:25) (92/60 - 116/64)  RR: 16 (08-05-22 @ 12:35) (16 - 18)  SpO2: 96% (08-05-22 @ 13:25) (93% - 96%)  Wt(kg): --      PHYSICAL EXAM:  GENERAL: NAD, well-groomed, well-developed  HEAD:  Atraumatic, Normocephalic  EYES: EOMI, PERRLA, conjunctiva and sclera clear  ENMT: No tonsillar erythema, exudates, or enlargement; Moist mucous membranes, Good dentition, No lesions  NECK: Supple, No JVD, Normal thyroid  NERVOUS SYSTEM:  Alert & Oriented X3, Good concentration; Motor Strength 5/5 B/L upper and lower extremities; DTRs 2+ intact and symmetric  CHEST/LUNG: Clear to percussion bilaterally; No rales, rhonchi, wheezing, or rubs  HEART: Regular rate and rhythm; No murmurs, rubs, or gallops  ABDOMEN: mild tenderness, Nondistended; Bowel sounds present  EXTREMITIES:  No C/C/E  LYMPH: No lymphadenopathy noted  SKIN: No rashes or lesions    LABS:        CBC Full  -  ( 05 Aug 2022 07:21 )  WBC Count : 9.20 K/uL  RBC Count : 2.75 M/uL  Hemoglobin : 9.2 g/dL  Hematocrit : 27.8 %  Platelet Count - Automated : 157 K/uL  Mean Cell Volume : 101.1 fl  Mean Cell Hemoglobin : 33.5 pg  Mean Cell Hemoglobin Concentration : 33.1 gm/dL  Auto Neutrophil # : x  Auto Lymphocyte # : x  Auto Monocyte # : x  Auto Eosinophil # : x  Auto Basophil # : x  Auto Neutrophil % : x  Auto Lymphocyte % : x  Auto Monocyte % : x  Auto Eosinophil % : x  Auto Basophil % : x    08-05    140  |  103  |  10  ----------------------------<  121<H>  3.9   |  25  |  0.59    Ca    9.3      05 Aug 2022 07:05        PT/INR - ( 05 Aug 2022 07:29 )   PT: 23.1 sec;   INR: 1.98 ratio             CAPILLARY BLOOD GLUCOSE          RADIOLOGY & ADDITIONAL TESTS:

## 2022-08-06 NOTE — PROGRESS NOTE ADULT - TIME BILLING
Discussed treatment plan with patient and family at bedside.

## 2022-08-06 NOTE — PROGRESS NOTE ADULT - PROBLEM SELECTOR PLAN 4
Patient with complaint of constipation  would start miralax daily and increase dose as needed   continue to monitor symptoms  GI eval as needed

## 2022-08-06 NOTE — PROVIDER CONTACT NOTE (OTHER) - SITUATION
95/64, due for atenolol and torsemide
Pt retaining urine
pt retaining 950ml on bladder scan
-130 after working w/ PT
95/58, 102/74
see previous provider contact note, pt w/ high HR
urinary retention

## 2022-08-06 NOTE — PROVIDER CONTACT NOTE (OTHER) - DATE AND TIME:
03-Aug-2022 06:45
03-Aug-2022 20:55
05-Aug-2022 21:50
04-Aug-2022 22:57
05-Aug-2022 14:02
05-Aug-2022 13:50
03-Aug-2022 06:05

## 2022-08-06 NOTE — PROVIDER CONTACT NOTE (OTHER) - NAME OF MD/NP/PA/DO NOTIFIED:
JENNIFER Gary
JENNIFER Hernández
Dillan Cyr MD
JENNIFER Yang
Kurt Osorio (PA)
Trey RODRIGUEZ
JENNIFER Gary

## 2022-08-06 NOTE — PROGRESS NOTE ADULT - PROBLEM SELECTOR PLAN 1
PT/OT- WBAT to LLE  IS  DVT PPx - Continue home dose Coumadin  Pain Control  Continue Current Tx.  Dispo planning home today with harris catheter; Family made aware to follow up with [facility] for trial of void    Gracie Hilliard PA-C  Team Pager: #[phone]
Patient tolerated the procedure well.  physical therapy as tolerated  continue pain meds as needed  DVT prophylaxis

## 2022-08-07 ENCOUNTER — NON-APPOINTMENT (OUTPATIENT)
Age: 82
End: 2022-08-07

## 2022-08-10 ENCOUNTER — OUTPATIENT (OUTPATIENT)
Dept: OUTPATIENT SERVICES | Facility: HOSPITAL | Age: 82
LOS: 1 days | End: 2022-08-10
Payer: MEDICARE

## 2022-08-10 ENCOUNTER — APPOINTMENT (OUTPATIENT)
Dept: UROLOGY | Facility: CLINIC | Age: 82
End: 2022-08-10

## 2022-08-10 DIAGNOSIS — Z46.6 ENCOUNTER FOR FITTING AND ADJUSTMENT OF URINARY DEVICE: ICD-10-CM

## 2022-08-10 DIAGNOSIS — R33.9 RETENTION OF URINE, UNSPECIFIED: ICD-10-CM

## 2022-08-10 DIAGNOSIS — R35.0 FREQUENCY OF MICTURITION: ICD-10-CM

## 2022-08-10 PROCEDURE — 51798 US URINE CAPACITY MEASURE: CPT

## 2022-08-10 PROCEDURE — 51700 IRRIGATION OF BLADDER: CPT

## 2022-08-10 RX ORDER — TRIMETHOPRIM 100 MG/1
100 TABLET ORAL DAILY
Qty: 5 | Refills: 0 | Status: ACTIVE | COMMUNITY
Start: 2022-08-10 | End: 1900-01-01

## 2022-08-10 NOTE — PHYSICAL EXAM
[General Appearance - Well Developed] : well developed [General Appearance - Well Nourished] : well nourished [Abdomen Soft] : soft [] : no respiratory distress [Oriented To Time, Place, And Person] : oriented to person, place, and time [No Focal Deficits] : no focal deficits [No Palpable Adenopathy] : no palpable adenopathy [FreeTextEntry1] : wheel chair bound

## 2022-08-10 NOTE — ASSESSMENT
[FreeTextEntry1] : I filled her bladder with 240 cc of sterile water . She did have the urge to void . Took her to the bathroom  She was not able to void . I suggested that she go outside , drink some liquids and come back . She only voids 3x/day .\par She was in the lobby and cafe x 2 hours She was able to void . PVR was zero I started her on 5 days of trimethaprim as a caution due to cloudy urine and a new Norris arthroplasty.\par The daughter has my contact informationn and will call for any urological issue . She is seeing the orthopaedist next week .

## 2022-08-10 NOTE — HISTORY OF PRESENT ILLNESS
[FreeTextEntry1] : Fell and had a femoral neck fracture on August 1, 2022  . A hemiarthroplasty was done on Aug 10th She failed voiding trial in the hospital She is here with her daughter and a friend for a trial void . Her hip dressing is dry and intact . Slight LLE edema

## 2022-08-10 NOTE — REVIEW OF SYSTEMS
Message  Patient scheduled for Excisional Biopsy of Left Breast with Ultrasound Guided Needle Localization as an outpatient procedure under General anesthesia @ [facility] by Dr. Horne on 3/1/2017.  Surgical Packet reviewed and given to patient for  Pre-Op education.  Patient understood and to call back with any further question or concerns.        Signatures   Electronically signed by : Joanne Haas, ; Feb 21 2017 11:04AM CST     [Feeling Poorly] : feeling poorly [Feeling Tired] : feeling tired [see HPI] : see HPI [Joint Pain] : joint pain [Negative] : Neurological

## 2022-08-11 ENCOUNTER — NON-APPOINTMENT (OUTPATIENT)
Age: 82
End: 2022-08-11

## 2022-08-12 ENCOUNTER — APPOINTMENT (OUTPATIENT)
Age: 82
End: 2022-08-12

## 2022-08-15 ENCOUNTER — APPOINTMENT (OUTPATIENT)
Dept: ORTHOPEDIC SURGERY | Facility: CLINIC | Age: 82
End: 2022-08-15

## 2022-08-15 LAB
INR PPP: 2.2
PROTHROMBIN TIME COMMENT: NORMAL

## 2022-08-15 PROCEDURE — 72170 X-RAY EXAM OF PELVIS: CPT

## 2022-08-15 PROCEDURE — 99024 POSTOP FOLLOW-UP VISIT: CPT

## 2022-08-20 ENCOUNTER — NON-APPOINTMENT (OUTPATIENT)
Age: 82
End: 2022-08-20

## 2022-08-22 LAB
INR PPP: 3
PROTHROMBIN TIME COMMENT: NORMAL

## 2022-08-24 DIAGNOSIS — R33.9 RETENTION OF URINE, UNSPECIFIED: ICD-10-CM

## 2022-08-24 DIAGNOSIS — Z46.6 ENCOUNTER FOR FITTING AND ADJUSTMENT OF URINARY DEVICE: ICD-10-CM

## 2022-08-29 LAB
INR PPP: 3.4
PROTHROMBIN TIME COMMENT: NORMAL

## 2022-08-31 ENCOUNTER — APPOINTMENT (OUTPATIENT)
Dept: UROLOGY | Facility: CLINIC | Age: 82
End: 2022-08-31

## 2022-09-01 LAB
INR PPP: 3.4
PROTHROMBIN TIME COMMENT: NORMAL

## 2022-09-07 ENCOUNTER — APPOINTMENT (OUTPATIENT)
Dept: ORTHOPEDIC SURGERY | Facility: CLINIC | Age: 82
End: 2022-09-07

## 2022-09-07 PROCEDURE — 72170 X-RAY EXAM OF PELVIS: CPT

## 2022-09-07 PROCEDURE — 99024 POSTOP FOLLOW-UP VISIT: CPT

## 2022-09-12 ENCOUNTER — NON-APPOINTMENT (OUTPATIENT)
Age: 82
End: 2022-09-12

## 2022-09-12 ENCOUNTER — APPOINTMENT (OUTPATIENT)
Dept: CARDIOLOGY | Facility: CLINIC | Age: 82
End: 2022-09-12

## 2022-09-12 ENCOUNTER — LABORATORY RESULT (OUTPATIENT)
Age: 82
End: 2022-09-12

## 2022-09-12 VITALS
DIASTOLIC BLOOD PRESSURE: 70 MMHG | BODY MASS INDEX: 23.51 KG/M2 | SYSTOLIC BLOOD PRESSURE: 92 MMHG | OXYGEN SATURATION: 99 % | WEIGHT: 112 LBS | HEART RATE: 90 BPM | HEIGHT: 58 IN

## 2022-09-12 VITALS — DIASTOLIC BLOOD PRESSURE: 60 MMHG | SYSTOLIC BLOOD PRESSURE: 102 MMHG

## 2022-09-12 VITALS — SYSTOLIC BLOOD PRESSURE: 94 MMHG | DIASTOLIC BLOOD PRESSURE: 58 MMHG

## 2022-09-12 LAB
INR PPP: 2.2
PROTHROMBIN TIME COMMENT: NORMAL

## 2022-09-12 PROCEDURE — 99215 OFFICE O/P EST HI 40 MIN: CPT

## 2022-09-12 PROCEDURE — 93000 ELECTROCARDIOGRAM COMPLETE: CPT

## 2022-09-12 PROCEDURE — 36415 COLL VENOUS BLD VENIPUNCTURE: CPT

## 2022-09-12 NOTE — HISTORY OF PRESENT ILLNESS
[FreeTextEntry1] : She presents in scheduled followup accompanied by her daughter August 1, she tripped in her bedroom and suffered a displaced left femoral neck fracture.  She underwent left hip hemiarthroplasty by Dr. Simms.  Her course was uncomplicated and no cardiac issues were raised.  At home she has been tolerating physical therapy and, per her daughter, has made significant progress.  She has an aide during the day.  And her daughter has been staying with her at night.  She uses her Rollator exclusively in her apartment.\par \par No palpitations, dizziness or syncope.  No dyspnea, orthopnea or PND.  No chest, throat or jaw discomfort.  Mild left greater than right edema.\par \par No anticoagulation related bleeding problems. \par \par Tolerating physical therapy without any specific effort provoked symptoms.\par \par \par \par \par

## 2022-09-13 LAB
ALBUMIN SERPL ELPH-MCNC: 4.3 G/DL
ALP BLD-CCNC: 218 U/L
ALT SERPL-CCNC: 15 U/L
ANION GAP SERPL CALC-SCNC: 11 MMOL/L
AST SERPL-CCNC: 19 U/L
BASOPHILS # BLD AUTO: 0.04 K/UL
BASOPHILS NFR BLD AUTO: 0.6 %
BILIRUB SERPL-MCNC: 0.3 MG/DL
BUN SERPL-MCNC: 20 MG/DL
CALCIUM SERPL-MCNC: 10.2 MG/DL
CHLORIDE SERPL-SCNC: 104 MMOL/L
CO2 SERPL-SCNC: 30 MMOL/L
CREAT SERPL-MCNC: 0.81 MG/DL
EGFR: 72 ML/MIN/1.73M2
EOSINOPHIL # BLD AUTO: 0.16 K/UL
EOSINOPHIL NFR BLD AUTO: 2.5 %
GLUCOSE SERPL-MCNC: 104 MG/DL
HCT VFR BLD CALC: 37.4 %
HGB BLD-MCNC: 11.5 G/DL
IMM GRANULOCYTES NFR BLD AUTO: 0.3 %
LYMPHOCYTES # BLD AUTO: 1.36 K/UL
LYMPHOCYTES NFR BLD AUTO: 21 %
MAN DIFF?: NORMAL
MCHC RBC-ENTMCNC: 30.7 GM/DL
MCHC RBC-ENTMCNC: 33.1 PG
MCV RBC AUTO: 107.8 FL
MONOCYTES # BLD AUTO: 0.4 K/UL
MONOCYTES NFR BLD AUTO: 6.2 %
NEUTROPHILS # BLD AUTO: 4.49 K/UL
NEUTROPHILS NFR BLD AUTO: 69.4 %
PLATELET # BLD AUTO: 265 K/UL
POTASSIUM SERPL-SCNC: 3.8 MMOL/L
PROT SERPL-MCNC: 7.5 G/DL
RBC # BLD: 3.47 M/UL
RBC # FLD: 15.8 %
SODIUM SERPL-SCNC: 145 MMOL/L
TSH SERPL-ACNC: 1.87 UIU/ML
WBC # FLD AUTO: 6.47 K/UL

## 2022-09-15 ENCOUNTER — NON-APPOINTMENT (OUTPATIENT)
Age: 82
End: 2022-09-15

## 2022-09-18 LAB
INR PPP: 2.1
PROTHROMBIN TIME COMMENT: NORMAL

## 2022-10-10 LAB
INR PPP: 1.8
PROTHROMBIN TIME COMMENT: NORMAL

## 2022-10-19 ENCOUNTER — NON-APPOINTMENT (OUTPATIENT)
Age: 82
End: 2022-10-19

## 2022-10-19 ENCOUNTER — APPOINTMENT (OUTPATIENT)
Dept: CARDIOLOGY | Facility: CLINIC | Age: 82
End: 2022-10-19

## 2022-10-19 VITALS
BODY MASS INDEX: 23.72 KG/M2 | DIASTOLIC BLOOD PRESSURE: 86 MMHG | HEART RATE: 93 BPM | OXYGEN SATURATION: 96 % | HEIGHT: 58 IN | SYSTOLIC BLOOD PRESSURE: 118 MMHG | WEIGHT: 113 LBS

## 2022-10-19 PROCEDURE — G0008: CPT

## 2022-10-19 PROCEDURE — 99213 OFFICE O/P EST LOW 20 MIN: CPT

## 2022-10-19 PROCEDURE — 90662 IIV NO PRSV INCREASED AG IM: CPT

## 2022-10-19 PROCEDURE — 85610 PROTHROMBIN TIME: CPT | Mod: QW

## 2022-10-19 NOTE — HISTORY OF PRESENT ILLNESS
[FreeTextEntry1] : She presents in scheduled followup accompanied by her daughter.  Feels well with the exception of her very troublesome IBS symptoms.  Slowly progressing activity and ambulate with a rolling walker without any specific effort provoked symptoms.  No edema since reduction in torsemide and she has not complained of lightheadedness.\par \par No palpitations or syncope.  No dyspnea, orthopnea or PND.  No chest, throat or jaw discomfort.  \par No anticoagulation related bleeding problems. \par \par \par \par \par

## 2022-10-20 ENCOUNTER — MED ADMIN CHARGE (OUTPATIENT)
Age: 82
End: 2022-10-20

## 2022-10-20 LAB
INR PPP: 2.2 RATIO
POCT-PROTHROMBIN TIME: 26.1 SECS
QUALITY CONTROL: YES

## 2022-10-28 LAB
INR PPP: 2.2
PROTHROMBIN TIME COMMENT: NORMAL

## 2022-11-11 LAB
INR PPP: 2.4
PROTHROMBIN TIME COMMENT: NORMAL

## 2022-11-20 LAB — INR PPP: 1.9

## 2022-12-01 LAB
INR PPP: 2.7
PROTHROMBIN TIME COMMENT: NORMAL

## 2022-12-02 LAB
INR PPP: 2.5
PROTHROMBIN TIME COMMENT: NORMAL

## 2022-12-05 ENCOUNTER — APPOINTMENT (OUTPATIENT)
Dept: ORTHOPEDIC SURGERY | Facility: CLINIC | Age: 82
End: 2022-12-05

## 2022-12-13 ENCOUNTER — RX RENEWAL (OUTPATIENT)
Age: 82
End: 2022-12-13

## 2022-12-14 LAB
INR PPP: 2.4
PROTHROMBIN TIME COMMENT: NORMAL

## 2022-12-23 LAB
INR PPP: 3
PROTHROMBIN TIME COMMENT: NORMAL

## 2023-01-03 ENCOUNTER — APPOINTMENT (OUTPATIENT)
Dept: ORTHOPEDIC SURGERY | Facility: CLINIC | Age: 83
End: 2023-01-03
Payer: MEDICARE

## 2023-01-03 DIAGNOSIS — S42.295D OTHER NONDISPLACED FRACTURE OF UPPER END OF LEFT HUMERUS, SUBSEQUENT ENCOUNTER FOR FRACTURE WITH ROUTINE HEALING: ICD-10-CM

## 2023-01-03 DIAGNOSIS — Z96.649 PRESENCE OF UNSPECIFIED ARTIFICIAL HIP JOINT: ICD-10-CM

## 2023-01-03 DIAGNOSIS — M25.512 PAIN IN LEFT SHOULDER: ICD-10-CM

## 2023-01-03 DIAGNOSIS — S72.002D FRACTURE OF UNSPECIFIED PART OF NECK OF LEFT FEMUR, SUBSEQUENT ENCOUNTER FOR CLOSED FRACTURE WITH ROUTINE HEALING: ICD-10-CM

## 2023-01-03 PROCEDURE — 72170 X-RAY EXAM OF PELVIS: CPT

## 2023-01-03 PROCEDURE — 99213 OFFICE O/P EST LOW 20 MIN: CPT

## 2023-01-08 PROBLEM — S72.002D CLOSED DISPLACED FRACTURE OF LEFT FEMORAL NECK WITH ROUTINE HEALING: Status: ACTIVE | Noted: 2022-08-12

## 2023-01-08 PROBLEM — Z96.649 S/P HIP HEMIARTHROPLASTY: Status: ACTIVE | Noted: 2022-08-12

## 2023-01-10 LAB
INR PPP: 2.3
PROTHROMBIN TIME COMMENT: NORMAL

## 2023-01-24 LAB
INR PPP: 3
PROTHROMBIN TIME COMMENT: NORMAL

## 2023-01-30 LAB
INR PPP: 2.7
PROTHROMBIN TIME COMMENT: NORMAL

## 2023-02-02 LAB
INR PPP: 2.8
PROTHROMBIN TIME COMMENT: NORMAL

## 2023-02-08 ENCOUNTER — RX RENEWAL (OUTPATIENT)
Age: 83
End: 2023-02-08

## 2023-02-10 ENCOUNTER — APPOINTMENT (OUTPATIENT)
Dept: CARDIOLOGY | Facility: CLINIC | Age: 83
End: 2023-02-10
Payer: MEDICARE

## 2023-02-10 ENCOUNTER — NON-APPOINTMENT (OUTPATIENT)
Age: 83
End: 2023-02-10

## 2023-02-10 VITALS
DIASTOLIC BLOOD PRESSURE: 62 MMHG | BODY MASS INDEX: 23.62 KG/M2 | WEIGHT: 113 LBS | HEART RATE: 89 BPM | OXYGEN SATURATION: 96 % | SYSTOLIC BLOOD PRESSURE: 100 MMHG

## 2023-02-10 PROCEDURE — 93000 ELECTROCARDIOGRAM COMPLETE: CPT

## 2023-02-10 PROCEDURE — 99214 OFFICE O/P EST MOD 30 MIN: CPT

## 2023-02-10 PROCEDURE — 85610 PROTHROMBIN TIME: CPT | Mod: QW

## 2023-02-10 PROCEDURE — 36415 COLL VENOUS BLD VENIPUNCTURE: CPT

## 2023-02-10 NOTE — HISTORY OF PRESENT ILLNESS
[FreeTextEntry1] : She presents in scheduled followup accompanied by her daughter.  Feels well offers no specific complaints.  Tolerating physical therapy without any specific effort provoked symptoms.\par No p chest, throat or jaw discomfort.  Alpitations or syncope.  No dyspnea, orthopnea or PND.  \par No anticoagulation related bleeding problems. \par \par \par \par \par

## 2023-02-12 LAB — NT-PROBNP SERPL-MCNC: 1466 PG/ML

## 2023-02-13 LAB
ALBUMIN SERPL ELPH-MCNC: 4.5 G/DL
ALP BLD-CCNC: 257 U/L
ALT SERPL-CCNC: 32 U/L
ANION GAP SERPL CALC-SCNC: 15 MMOL/L
AST SERPL-CCNC: 35 U/L
BASOPHILS # BLD AUTO: 0.05 K/UL
BASOPHILS NFR BLD AUTO: 0.7 %
BILIRUB SERPL-MCNC: 0.3 MG/DL
BUN SERPL-MCNC: 19 MG/DL
CALCIUM SERPL-MCNC: 10.3 MG/DL
CHLORIDE SERPL-SCNC: 105 MMOL/L
CO2 SERPL-SCNC: 24 MMOL/L
CREAT SERPL-MCNC: 0.75 MG/DL
EGFR: 78 ML/MIN/1.73M2
EOSINOPHIL # BLD AUTO: 0.08 K/UL
EOSINOPHIL NFR BLD AUTO: 1.2 %
ESTIMATED AVERAGE GLUCOSE: 123 MG/DL
GLUCOSE SERPL-MCNC: 143 MG/DL
HBA1C MFR BLD HPLC: 5.9 %
HCT VFR BLD CALC: 40.2 %
HGB BLD-MCNC: 13.4 G/DL
IMM GRANULOCYTES NFR BLD AUTO: 0.3 %
INR PPP: 2.5 RATIO
LYMPHOCYTES # BLD AUTO: 2.74 K/UL
LYMPHOCYTES NFR BLD AUTO: 39.8 %
MAN DIFF?: NORMAL
MCHC RBC-ENTMCNC: 33 PG
MCHC RBC-ENTMCNC: 33.3 GM/DL
MCV RBC AUTO: 99 FL
MONOCYTES # BLD AUTO: 0.37 K/UL
MONOCYTES NFR BLD AUTO: 5.4 %
NEUTROPHILS # BLD AUTO: 3.62 K/UL
NEUTROPHILS NFR BLD AUTO: 52.6 %
PLATELET # BLD AUTO: 183 K/UL
POTASSIUM SERPL-SCNC: 3.6 MMOL/L
PROT SERPL-MCNC: 7.5 G/DL
RBC # BLD: 4.06 M/UL
RBC # FLD: 15.2 %
SODIUM SERPL-SCNC: 144 MMOL/L
TSH SERPL-ACNC: 1.89 UIU/ML
WBC # FLD AUTO: 6.88 K/UL

## 2023-02-22 LAB
INR PPP: 2.9
PROTHROMBIN TIME COMMENT: NORMAL

## 2023-03-06 ENCOUNTER — APPOINTMENT (OUTPATIENT)
Dept: ORTHOPEDIC SURGERY | Facility: CLINIC | Age: 83
End: 2023-03-06

## 2023-03-06 LAB
INR PPP: 2
PROTHROMBIN TIME COMMENT: NORMAL

## 2023-03-16 LAB
INR PPP: 2.7
PROTHROMBIN TIME COMMENT: NORMAL

## 2023-04-04 LAB
INR PPP: 2
PROTHROMBIN TIME COMMENT: NORMAL

## 2023-04-17 LAB
INR PPP: 2.9
PROTHROMBIN TIME COMMENT: NORMAL

## 2023-04-24 ENCOUNTER — APPOINTMENT (OUTPATIENT)
Dept: ORTHOPEDIC SURGERY | Facility: CLINIC | Age: 83
End: 2023-04-24

## 2023-04-25 LAB
INR PPP: 3.3
PROTHROMBIN TIME COMMENT: NORMAL

## 2023-05-02 LAB — INR PPP: 2.4

## 2023-05-10 ENCOUNTER — APPOINTMENT (OUTPATIENT)
Dept: CARDIOLOGY | Facility: CLINIC | Age: 83
End: 2023-05-10
Payer: MEDICARE

## 2023-05-10 ENCOUNTER — NON-APPOINTMENT (OUTPATIENT)
Age: 83
End: 2023-05-10

## 2023-05-10 VITALS
HEART RATE: 97 BPM | DIASTOLIC BLOOD PRESSURE: 68 MMHG | OXYGEN SATURATION: 97 % | HEIGHT: 58 IN | SYSTOLIC BLOOD PRESSURE: 118 MMHG

## 2023-05-10 LAB
INR PPP: 2.4
INR PPP: 2.5
PROTHROMBIN TIME COMMENT: NORMAL

## 2023-05-10 PROCEDURE — 85610 PROTHROMBIN TIME: CPT | Mod: QW

## 2023-05-10 PROCEDURE — 93000 ELECTROCARDIOGRAM COMPLETE: CPT

## 2023-05-10 PROCEDURE — 99214 OFFICE O/P EST MOD 30 MIN: CPT

## 2023-05-10 PROCEDURE — 93306 TTE W/DOPPLER COMPLETE: CPT

## 2023-05-10 NOTE — HISTORY OF PRESENT ILLNESS
[FreeTextEntry1] : She presents in scheduled followup accompanied by her daughter.  Her  suddenly passed away few weeks ago when she is still actively grieving.  Sleeping and eating well.  Her only significant interval problem has been severe left shoulder pain.  She has an appointment to see Dr. Yaniv Ferrari in the near future.  No symptoms with limited activity. \par No p chest, throat or jaw discomfort.  Alpitations or syncope.  No dyspnea, orthopnea or PND.  \par No anticoagulation related bleeding problems. \par \par \par \par \par

## 2023-06-05 LAB
INR PPP: 2.3
PROTHROMBIN TIME COMMENT: NORMAL

## 2023-06-11 LAB — INR PPP: 2.5

## 2023-06-29 LAB
INR PPP: 2.4
PROTHROMBIN TIME COMMENT: NORMAL

## 2023-07-03 LAB
INR PPP: 2.8
PROTHROMBIN TIME COMMENT: NORMAL

## 2023-07-12 LAB
INR PPP: 2.7
PROTHROMBIN TIME COMMENT: NORMAL

## 2023-07-24 LAB
INR PPP: 2.7
PROTHROMBIN TIME COMMENT: NORMAL

## 2023-08-07 LAB
INR PPP: 3.1
PROTHROMBIN TIME COMMENT: NORMAL

## 2023-08-15 ENCOUNTER — LABORATORY RESULT (OUTPATIENT)
Age: 83
End: 2023-08-15

## 2023-08-15 ENCOUNTER — APPOINTMENT (OUTPATIENT)
Dept: CARDIOLOGY | Facility: CLINIC | Age: 83
End: 2023-08-15
Payer: MEDICARE

## 2023-08-15 VITALS
RESPIRATION RATE: 17 BRPM | BODY MASS INDEX: 24.14 KG/M2 | OXYGEN SATURATION: 96 % | DIASTOLIC BLOOD PRESSURE: 76 MMHG | SYSTOLIC BLOOD PRESSURE: 126 MMHG | HEART RATE: 87 BPM | WEIGHT: 115 LBS | HEIGHT: 58 IN

## 2023-08-15 LAB
INR PPP: 3.8 RATIO
QUALITY CONTROL: YES

## 2023-08-15 PROCEDURE — 99214 OFFICE O/P EST MOD 30 MIN: CPT

## 2023-08-15 PROCEDURE — 93000 ELECTROCARDIOGRAM COMPLETE: CPT

## 2023-08-15 NOTE — HISTORY OF PRESENT ILLNESS
[FreeTextEntry1] : She presents in scheduled followup accompanied by her daughter.  She has been feeling well and is somewhat less depressed.  Offers no specific complaints or concerns.  No symptoms with limited activity.  No p chest, throat or jaw discomfort.  No palpitations or syncope.  No dyspnea, orthopnea or PND..  No edema or claudication. No anticoagulation related bleeding problems.  INR is 3.8 today but she reports no change in her diet or antibiotic therapy of late.

## 2023-08-16 LAB
ALBUMIN SERPL ELPH-MCNC: 4.7 G/DL
ALP BLD-CCNC: 253 U/L
ALT SERPL-CCNC: 37 U/L
ANION GAP SERPL CALC-SCNC: 12 MMOL/L
AST SERPL-CCNC: 31 U/L
BILIRUB SERPL-MCNC: 0.3 MG/DL
BUN SERPL-MCNC: 19 MG/DL
CALCIUM SERPL-MCNC: 10.2 MG/DL
CHLORIDE SERPL-SCNC: 105 MMOL/L
CO2 SERPL-SCNC: 28 MMOL/L
CREAT SERPL-MCNC: 0.81 MG/DL
EGFR: 71 ML/MIN/1.73M2
ESTIMATED AVERAGE GLUCOSE: 117 MG/DL
GLUCOSE SERPL-MCNC: 90 MG/DL
HBA1C MFR BLD HPLC: 5.7 %
NT-PROBNP SERPL-MCNC: 1864 PG/ML
POTASSIUM SERPL-SCNC: 3.9 MMOL/L
PROT SERPL-MCNC: 7.6 G/DL
SODIUM SERPL-SCNC: 145 MMOL/L

## 2023-09-03 LAB
INR PPP: 2.3
PROTHROMBIN TIME COMMENT: NORMAL

## 2023-09-15 LAB
INR PPP: 3.4
PROTHROMBIN TIME COMMENT: NORMAL

## 2023-09-17 LAB
INR PPP: 3.6
PROTHROMBIN TIME COMMENT: NORMAL

## 2023-10-02 LAB
INR PPP: 3.2
PROTHROMBIN TIME COMMENT: NORMAL

## 2023-10-04 LAB
INR PPP: 2.8
PROTHROMBIN TIME COMMENT: NORMAL

## 2023-10-20 LAB
INR PPP: 3.2
PROTHROMBIN TIME COMMENT: NORMAL

## 2023-10-27 LAB
INR PPP: 3.1
PROTHROMBIN TIME COMMENT: NORMAL

## 2023-11-03 LAB
INR PPP: 2.2
PROTHROMBIN TIME COMMENT: NORMAL

## 2023-11-12 LAB
INR PPP: 2.6
PROTHROMBIN TIME COMMENT: NORMAL

## 2023-11-15 ENCOUNTER — NON-APPOINTMENT (OUTPATIENT)
Age: 83
End: 2023-11-15

## 2023-11-15 ENCOUNTER — APPOINTMENT (OUTPATIENT)
Dept: CARDIOLOGY | Facility: CLINIC | Age: 83
End: 2023-11-15
Payer: MEDICARE

## 2023-11-15 VITALS
DIASTOLIC BLOOD PRESSURE: 68 MMHG | OXYGEN SATURATION: 97 % | BODY MASS INDEX: 24.24 KG/M2 | SYSTOLIC BLOOD PRESSURE: 110 MMHG | WEIGHT: 116 LBS | HEART RATE: 93 BPM

## 2023-11-15 PROCEDURE — 93000 ELECTROCARDIOGRAM COMPLETE: CPT

## 2023-11-15 PROCEDURE — 36415 COLL VENOUS BLD VENIPUNCTURE: CPT

## 2023-11-15 PROCEDURE — 99214 OFFICE O/P EST MOD 30 MIN: CPT

## 2023-11-15 PROCEDURE — 85610 PROTHROMBIN TIME: CPT | Mod: QW

## 2023-11-16 LAB
ALBUMIN SERPL ELPH-MCNC: 4.8 G/DL
ALP BLD-CCNC: 235 U/L
ALT SERPL-CCNC: 30 U/L
ANION GAP SERPL CALC-SCNC: 15 MMOL/L
AST SERPL-CCNC: 29 U/L
BASOPHILS # BLD AUTO: 0.06 K/UL
BASOPHILS NFR BLD AUTO: 0.8 %
BILIRUB SERPL-MCNC: 0.3 MG/DL
BUN SERPL-MCNC: 16 MG/DL
CALCIUM SERPL-MCNC: 10 MG/DL
CHLORIDE SERPL-SCNC: 103 MMOL/L
CHOLEST SERPL-MCNC: 189 MG/DL
CO2 SERPL-SCNC: 24 MMOL/L
CREAT SERPL-MCNC: 0.74 MG/DL
EGFR: 79 ML/MIN/1.73M2
EOSINOPHIL # BLD AUTO: 0.06 K/UL
EOSINOPHIL NFR BLD AUTO: 0.8 %
ESTIMATED AVERAGE GLUCOSE: 117 MG/DL
GLUCOSE SERPL-MCNC: 119 MG/DL
HBA1C MFR BLD HPLC: 5.7 %
HCT VFR BLD CALC: 41.9 %
HDLC SERPL-MCNC: 73 MG/DL
HGB BLD-MCNC: 13.8 G/DL
IMM GRANULOCYTES NFR BLD AUTO: 0.5 %
INR PPP: 3.3 RATIO
LDLC SERPL CALC-MCNC: 99 MG/DL
LDLC SERPL DIRECT ASSAY-MCNC: 100 MG/DL
LYMPHOCYTES # BLD AUTO: 2.24 K/UL
LYMPHOCYTES NFR BLD AUTO: 29 %
MAGNESIUM SERPL-MCNC: 2.2 MG/DL
MAN DIFF?: NORMAL
MCHC RBC-ENTMCNC: 32.9 GM/DL
MCHC RBC-ENTMCNC: 34.1 PG
MCV RBC AUTO: 103.5 FL
MONOCYTES # BLD AUTO: 0.58 K/UL
MONOCYTES NFR BLD AUTO: 7.5 %
NEUTROPHILS # BLD AUTO: 4.74 K/UL
NEUTROPHILS NFR BLD AUTO: 61.4 %
NONHDLC SERPL-MCNC: 117 MG/DL
NT-PROBNP SERPL-MCNC: 1731 PG/ML
PLATELET # BLD AUTO: 200 K/UL
POTASSIUM SERPL-SCNC: 4 MMOL/L
PROT SERPL-MCNC: 7.7 G/DL
PT BLD: 36.3 SEC
RBC # BLD: 4.05 M/UL
RBC # FLD: 13.3 %
SODIUM SERPL-SCNC: 143 MMOL/L
TRIGL SERPL-MCNC: 97 MG/DL
TSH SERPL-ACNC: 2.6 UIU/ML
WBC # FLD AUTO: 7.72 K/UL

## 2023-11-27 LAB
INR PPP: 2.7
PROTHROMBIN TIME COMMENT: NORMAL

## 2023-12-07 ENCOUNTER — RX RENEWAL (OUTPATIENT)
Age: 83
End: 2023-12-07

## 2023-12-08 RX ORDER — TORSEMIDE 10 MG/1
10 TABLET ORAL EVERY OTHER DAY
Qty: 90 | Refills: 3 | Status: ACTIVE | COMMUNITY
Start: 2021-12-01 | End: 1900-01-01

## 2023-12-13 LAB
INR PPP: 2.3
PROTHROMBIN TIME COMMENT: NORMAL

## 2023-12-15 LAB
INR PPP: 2.9
PROTHROMBIN TIME COMMENT: NORMAL

## 2023-12-26 ENCOUNTER — RX RENEWAL (OUTPATIENT)
Age: 83
End: 2023-12-26

## 2024-01-09 ENCOUNTER — APPOINTMENT (OUTPATIENT)
Dept: CARDIOLOGY | Facility: CLINIC | Age: 84
End: 2024-01-09

## 2024-01-12 LAB
INR PPP: 3.1
PROTHROMBIN TIME COMMENT: NORMAL

## 2024-01-17 ENCOUNTER — APPOINTMENT (OUTPATIENT)
Dept: CARDIOLOGY | Facility: CLINIC | Age: 84
End: 2024-01-17

## 2024-01-18 LAB
INR PPP: 3.4
PROTHROMBIN TIME COMMENT: NORMAL

## 2024-01-24 LAB
INR PPP: 2
PROTHROMBIN TIME COMMENT: NORMAL

## 2024-02-04 LAB
INR PPP: 2.9
PROTHROMBIN TIME COMMENT: NORMAL

## 2024-02-06 ENCOUNTER — APPOINTMENT (OUTPATIENT)
Dept: CARDIOLOGY | Facility: CLINIC | Age: 84
End: 2024-02-06
Payer: MEDICARE

## 2024-02-06 ENCOUNTER — NON-APPOINTMENT (OUTPATIENT)
Age: 84
End: 2024-02-06

## 2024-02-06 VITALS
HEIGHT: 58 IN | SYSTOLIC BLOOD PRESSURE: 120 MMHG | OXYGEN SATURATION: 97 % | DIASTOLIC BLOOD PRESSURE: 68 MMHG | BODY MASS INDEX: 24.14 KG/M2 | WEIGHT: 115 LBS | HEART RATE: 89 BPM

## 2024-02-06 DIAGNOSIS — Z79.01 LONG TERM (CURRENT) USE OF ANTICOAGULANTS: ICD-10-CM

## 2024-02-06 DIAGNOSIS — I34.0 NONRHEUMATIC MITRAL (VALVE) INSUFFICIENCY: ICD-10-CM

## 2024-02-06 PROCEDURE — 93000 ELECTROCARDIOGRAM COMPLETE: CPT

## 2024-02-06 PROCEDURE — 93306 TTE W/DOPPLER COMPLETE: CPT

## 2024-02-06 PROCEDURE — 36415 COLL VENOUS BLD VENIPUNCTURE: CPT

## 2024-02-06 PROCEDURE — 85610 PROTHROMBIN TIME: CPT | Mod: QW

## 2024-02-06 PROCEDURE — 99214 OFFICE O/P EST MOD 30 MIN: CPT

## 2024-02-06 NOTE — HISTORY OF PRESENT ILLNESS
[FreeTextEntry1] :  presents in scheduled follow-up accompanied by her daughter.  She has been "okay with the exception of arthritic pains, particularly shoulder pain with some relief following the addition of oxycodone.  Relates several times daily in her apartment with her rollator without any specific effort provoke symptoms.  No c/o chest, throat,jaw, arm or upper back discomfort.  No dyspnea, orthopnea or PND.  No palpitations, dizziness or syncope.  Minimal right ankle edema.  No laudication.    No anticoagulation related bleeding issues other than single minor nosebleed couple of months ago.

## 2024-02-08 LAB
ALBUMIN SERPL ELPH-MCNC: 4.5 G/DL
ALP BLD-CCNC: 250 U/L
ALT SERPL-CCNC: 24 U/L
ANION GAP SERPL CALC-SCNC: 15 MMOL/L
AST SERPL-CCNC: 29 U/L
BASOPHILS # BLD AUTO: 0.06 K/UL
BASOPHILS NFR BLD AUTO: 0.8 %
BILIRUB SERPL-MCNC: 0.2 MG/DL
BUN SERPL-MCNC: 14 MG/DL
CALCIUM SERPL-MCNC: 10.1 MG/DL
CHLORIDE SERPL-SCNC: 106 MMOL/L
CO2 SERPL-SCNC: 22 MMOL/L
CREAT SERPL-MCNC: 0.69 MG/DL
EGFR: 85 ML/MIN/1.73M2
EOSINOPHIL # BLD AUTO: 0.1 K/UL
EOSINOPHIL NFR BLD AUTO: 1.3 %
GLUCOSE SERPL-MCNC: 110 MG/DL
HCT VFR BLD CALC: 38 %
HGB BLD-MCNC: 12.5 G/DL
IMM GRANULOCYTES NFR BLD AUTO: 0.4 %
LYMPHOCYTES # BLD AUTO: 2.26 K/UL
LYMPHOCYTES NFR BLD AUTO: 28.3 %
MAGNESIUM SERPL-MCNC: 2.3 MG/DL
MAN DIFF?: NORMAL
MCHC RBC-ENTMCNC: 32.9 GM/DL
MCHC RBC-ENTMCNC: 33.2 PG
MCV RBC AUTO: 101.1 FL
MONOCYTES # BLD AUTO: 0.55 K/UL
MONOCYTES NFR BLD AUTO: 6.9 %
NEUTROPHILS # BLD AUTO: 4.98 K/UL
NEUTROPHILS NFR BLD AUTO: 62.3 %
NT-PROBNP SERPL-MCNC: 1680 PG/ML
PLATELET # BLD AUTO: 213 K/UL
POTASSIUM SERPL-SCNC: 4.1 MMOL/L
PROT SERPL-MCNC: 7.5 G/DL
RBC # BLD: 3.76 M/UL
RBC # FLD: 13.8 %
SODIUM SERPL-SCNC: 143 MMOL/L
WBC # FLD AUTO: 7.98 K/UL

## 2024-02-14 LAB
INR PPP: 2.2
PROTHROMBIN TIME COMMENT: NORMAL

## 2024-02-26 LAB
INR PPP: 2.9
PROTHROMBIN TIME COMMENT: NORMAL

## 2024-03-05 LAB
INR PPP: 2.9
PROTHROMBIN TIME COMMENT: NORMAL

## 2024-03-07 LAB
INR PPP: 3.6
PROTHROMBIN TIME COMMENT: NORMAL

## 2024-03-11 LAB
INR PPP: 2.5
PROTHROMBIN TIME COMMENT: NORMAL

## 2024-03-19 LAB
INR PPP: 3.7
PROTHROMBIN TIME COMMENT: NORMAL

## 2024-03-29 LAB
INR PPP: 3
PROTHROMBIN TIME COMMENT: NORMAL

## 2024-04-01 LAB
INR PPP: 3.4
PROTHROMBIN TIME COMMENT: NORMAL

## 2024-04-09 LAB
INR PPP: 3.1
PROTHROMBIN TIME COMMENT: NORMAL

## 2024-04-16 LAB
INR PPP: 2.9
PROTHROMBIN TIME COMMENT: NORMAL

## 2024-04-25 LAB
INR PPP: 3
PROTHROMBIN TIME COMMENT: NORMAL

## 2024-04-29 LAB
INR PPP: 2.2
INR PPP: 3.2
PROTHROMBIN TIME COMMENT: NORMAL
PROTHROMBIN TIME COMMENT: NORMAL

## 2024-05-06 LAB
INR PPP: 2.3
PROTHROMBIN TIME COMMENT: NORMAL

## 2024-05-08 ENCOUNTER — APPOINTMENT (OUTPATIENT)
Dept: CARDIOLOGY | Facility: CLINIC | Age: 84
End: 2024-05-08

## 2024-05-18 LAB
INR PPP: 3
INR PPP: 3.8
PROTHROMBIN TIME COMMENT: NORMAL
PROTHROMBIN TIME COMMENT: NORMAL

## 2024-05-23 LAB
INR PPP: 3
PROTHROMBIN TIME COMMENT: NORMAL

## 2024-05-28 LAB
INR PPP: 3.2
PROTHROMBIN TIME COMMENT: NORMAL

## 2024-05-30 ENCOUNTER — APPOINTMENT (OUTPATIENT)
Dept: CARDIOLOGY | Facility: CLINIC | Age: 84
End: 2024-05-30
Payer: MEDICARE

## 2024-05-30 ENCOUNTER — NON-APPOINTMENT (OUTPATIENT)
Age: 84
End: 2024-05-30

## 2024-05-30 VITALS
SYSTOLIC BLOOD PRESSURE: 122 MMHG | WEIGHT: 114 LBS | HEART RATE: 81 BPM | DIASTOLIC BLOOD PRESSURE: 70 MMHG | OXYGEN SATURATION: 95 % | BODY MASS INDEX: 23.83 KG/M2

## 2024-05-30 DIAGNOSIS — I10 ESSENTIAL (PRIMARY) HYPERTENSION: ICD-10-CM

## 2024-05-30 DIAGNOSIS — I36.1 NONRHEUMATIC TRICUSPID (VALVE) INSUFFICIENCY: ICD-10-CM

## 2024-05-30 DIAGNOSIS — E78.5 HYPERLIPIDEMIA, UNSPECIFIED: ICD-10-CM

## 2024-05-30 DIAGNOSIS — Z51.81 ENCOUNTER FOR THERAPEUTIC DRUG LVL MONITORING: ICD-10-CM

## 2024-05-30 DIAGNOSIS — I34.0 NONRHEUMATIC MITRAL (VALVE) INSUFFICIENCY: ICD-10-CM

## 2024-05-30 DIAGNOSIS — I48.91 UNSPECIFIED ATRIAL FIBRILLATION: ICD-10-CM

## 2024-05-30 DIAGNOSIS — I50.9 HEART FAILURE, UNSPECIFIED: ICD-10-CM

## 2024-05-30 DIAGNOSIS — Z79.01 ENCOUNTER FOR THERAPEUTIC DRUG LVL MONITORING: ICD-10-CM

## 2024-05-30 DIAGNOSIS — I27.20 PULMONARY HYPERTENSION, UNSPECIFIED: ICD-10-CM

## 2024-05-30 DIAGNOSIS — I07.1 RHEUMATIC TRICUSPID INSUFFICIENCY: ICD-10-CM

## 2024-05-30 PROCEDURE — G2211 COMPLEX E/M VISIT ADD ON: CPT

## 2024-05-30 PROCEDURE — 99214 OFFICE O/P EST MOD 30 MIN: CPT

## 2024-05-30 PROCEDURE — 93000 ELECTROCARDIOGRAM COMPLETE: CPT

## 2024-05-30 RX ORDER — OXYCODONE HYDROCHLORIDE 5 MG/1
5 CAPSULE ORAL
Refills: 0 | Status: ACTIVE | COMMUNITY
Start: 2024-05-30

## 2024-05-30 NOTE — DISCUSSION/SUMMARY
[EKG obtained to assist in diagnosis and management of assessed problem(s)] : EKG obtained to assist in diagnosis and management of assessed problem(s)
Pt comes from doctors office, c/o high blood pressure, hx of HTN, has not been on medication since before covid, also c/o fever and cough since Friday

## 2024-05-30 NOTE — CARDIOLOGY SUMMARY
[de-identified] : 2/6/2024: LV endocardium not well-visualized but function appears normal with an EF of 55 to 60%.  Severe LAE.  Moderate RVE.  Severe right atrial enlargement.  Mild to moderate AI.  Mild to moderate MR.

## 2024-05-30 NOTE — HISTORY OF PRESENT ILLNESS
[FreeTextEntry1] :  presents in scheduled follow-up accompanied by her daughter.  She has been "okay with the exception of arthritic pains, particularly shoulder pain with some relief following the addition of oxycodone.  Walks several times daily in her apartment with her rollator without any specific effort provoked symptoms.  No c/o chest, throat,jaw, arm or upper back discomfort.  No dyspnea, orthopnea or PND.  No palpitations, dizziness or syncope.  Minimal right ankle edema without change.  No claudication.    No anticoagulation related bleeding issues.

## 2024-06-01 LAB — NT-PROBNP SERPL-MCNC: 1541 PG/ML

## 2024-06-10 ENCOUNTER — RX RENEWAL (OUTPATIENT)
Age: 84
End: 2024-06-10

## 2024-06-10 LAB
INR PPP: 3.4
PROTHROMBIN TIME COMMENT: NORMAL

## 2024-06-19 LAB
INR PPP: 3.9
PROTHROMBIN TIME COMMENT: NORMAL

## 2024-07-02 LAB
INR PPP: 3.6
PROTHROMBIN TIME COMMENT: NORMAL

## 2024-07-08 LAB
INR PPP: 2
PROTHROMBIN TIME COMMENT: NORMAL

## 2024-07-18 LAB
INR PPP: 3
PROTHROMBIN TIME COMMENT: NORMAL

## 2024-07-29 LAB
INR PPP: 2.3
PROTHROMBIN TIME COMMENT: NORMAL

## 2024-07-30 LAB
INR PPP: 3
PROTHROMBIN TIME COMMENT: NORMAL

## 2024-08-14 LAB
INR PPP: 3.4
PROTHROMBIN TIME AND INR, PLASMA: NORMAL
PROTHROMBIN TIME COMMENT: NORMAL

## 2024-08-20 ENCOUNTER — RX RENEWAL (OUTPATIENT)
Age: 84
End: 2024-08-20

## 2024-08-26 ENCOUNTER — APPOINTMENT (OUTPATIENT)
Dept: ORTHOPEDIC SURGERY | Facility: CLINIC | Age: 84
End: 2024-08-26

## 2024-08-28 LAB
INR PPP: 2.7
PROTHROMBIN TIME COMMENT: NORMAL

## 2024-08-30 LAB
INR PPP: 3.1
PROTHROMBIN TIME COMMENT: NORMAL

## 2024-09-05 LAB
INR PPP: 3.8
PROTHROMBIN TIME COMMENT: NORMAL

## 2024-09-09 LAB
INR PPP: 1.8
PROTHROMBIN TIME COMMENT: NORMAL

## 2024-09-19 LAB
INR PPP: 3
PROTHROMBIN TIME COMMENT: NORMAL

## 2024-10-03 LAB
INR PPP: 2.3
PROTHROMBIN TIME COMMENT: NORMAL

## 2024-10-14 LAB
INR PPP: 2.7
PROTHROMBIN TIME COMMENT: NORMAL

## 2024-10-29 ENCOUNTER — APPOINTMENT (OUTPATIENT)
Dept: CARDIOLOGY | Facility: CLINIC | Age: 84
End: 2024-10-29

## 2024-10-31 ENCOUNTER — NON-APPOINTMENT (OUTPATIENT)
Age: 84
End: 2024-10-31

## 2024-10-31 ENCOUNTER — APPOINTMENT (OUTPATIENT)
Dept: CARDIOLOGY | Facility: CLINIC | Age: 84
End: 2024-10-31
Payer: MEDICARE

## 2024-10-31 VITALS
OXYGEN SATURATION: 96 % | DIASTOLIC BLOOD PRESSURE: 74 MMHG | SYSTOLIC BLOOD PRESSURE: 116 MMHG | BODY MASS INDEX: 24.24 KG/M2 | WEIGHT: 116 LBS | HEART RATE: 83 BPM

## 2024-10-31 DIAGNOSIS — I50.9 HEART FAILURE, UNSPECIFIED: ICD-10-CM

## 2024-10-31 DIAGNOSIS — Z79.01 ENCOUNTER FOR THERAPEUTIC DRUG LVL MONITORING: ICD-10-CM

## 2024-10-31 DIAGNOSIS — I10 ESSENTIAL (PRIMARY) HYPERTENSION: ICD-10-CM

## 2024-10-31 DIAGNOSIS — I34.0 NONRHEUMATIC MITRAL (VALVE) INSUFFICIENCY: ICD-10-CM

## 2024-10-31 DIAGNOSIS — Z51.81 ENCOUNTER FOR THERAPEUTIC DRUG LVL MONITORING: ICD-10-CM

## 2024-10-31 DIAGNOSIS — E78.5 HYPERLIPIDEMIA, UNSPECIFIED: ICD-10-CM

## 2024-10-31 DIAGNOSIS — I48.91 UNSPECIFIED ATRIAL FIBRILLATION: ICD-10-CM

## 2024-10-31 LAB
INR PPP: 3.3
INR PPP: 3.6
PROTHROMBIN TIME COMMENT: NORMAL
PROTHROMBIN TIME COMMENT: NORMAL

## 2024-10-31 PROCEDURE — 85610 PROTHROMBIN TIME: CPT | Mod: QW

## 2024-10-31 PROCEDURE — G0008: CPT

## 2024-10-31 PROCEDURE — 36415 COLL VENOUS BLD VENIPUNCTURE: CPT

## 2024-10-31 PROCEDURE — 99214 OFFICE O/P EST MOD 30 MIN: CPT

## 2024-10-31 PROCEDURE — 93000 ELECTROCARDIOGRAM COMPLETE: CPT

## 2024-10-31 PROCEDURE — 90662 IIV NO PRSV INCREASED AG IM: CPT

## 2024-11-01 LAB
ALBUMIN SERPL ELPH-MCNC: 4.3 G/DL
ALP BLD-CCNC: 342 U/L
ALT SERPL-CCNC: 31 U/L
ANION GAP SERPL CALC-SCNC: 13 MMOL/L
AST SERPL-CCNC: 28 U/L
BASOPHILS # BLD AUTO: 0.03 K/UL
BASOPHILS NFR BLD AUTO: 0.4 %
BILIRUB SERPL-MCNC: 0.3 MG/DL
BUN SERPL-MCNC: 18 MG/DL
CALCIUM SERPL-MCNC: 9.7 MG/DL
CHLORIDE SERPL-SCNC: 107 MMOL/L
CO2 SERPL-SCNC: 22 MMOL/L
CREAT SERPL-MCNC: 0.73 MG/DL
EGFR: 80 ML/MIN/1.73M2
EOSINOPHIL # BLD AUTO: 0.06 K/UL
EOSINOPHIL NFR BLD AUTO: 0.9 %
ESTIMATED AVERAGE GLUCOSE: 126 MG/DL
GLUCOSE SERPL-MCNC: 96 MG/DL
HBA1C MFR BLD HPLC: 6 %
HCT VFR BLD CALC: 38.2 %
HGB BLD-MCNC: 12.3 G/DL
IMM GRANULOCYTES NFR BLD AUTO: 0.1 %
LYMPHOCYTES # BLD AUTO: 1.88 K/UL
LYMPHOCYTES NFR BLD AUTO: 27.6 %
MAN DIFF?: NORMAL
MCHC RBC-ENTMCNC: 32.2 G/DL
MCHC RBC-ENTMCNC: 33 PG
MCV RBC AUTO: 102.4 FL
MONOCYTES # BLD AUTO: 0.43 K/UL
MONOCYTES NFR BLD AUTO: 6.3 %
NEUTROPHILS # BLD AUTO: 4.39 K/UL
NEUTROPHILS NFR BLD AUTO: 64.7 %
NT-PROBNP SERPL-MCNC: 1940 PG/ML
PLATELET # BLD AUTO: 170 K/UL
POTASSIUM SERPL-SCNC: 4.3 MMOL/L
PROT SERPL-MCNC: 7.2 G/DL
RBC # BLD: 3.73 M/UL
RBC # FLD: 14.9 %
SODIUM SERPL-SCNC: 142 MMOL/L
TSH SERPL-ACNC: 2.13 UIU/ML
WBC # FLD AUTO: 6.8 K/UL

## 2024-11-06 LAB
INR PPP: 2.7
INR PPP: 2.8
PROTHROMBIN TIME COMMENT: NORMAL
PROTHROMBIN TIME COMMENT: NORMAL

## 2024-11-11 LAB
INR PPP: 3.1
PROTHROMBIN TIME COMMENT: NORMAL

## 2024-11-27 LAB
INR PPP: 2.1
INR PPP: 3
PROTHROMBIN TIME COMMENT: NORMAL
PROTHROMBIN TIME COMMENT: NORMAL

## 2024-12-10 LAB
INR PPP: 2.6
INR PPP: 2.8
PROTHROMBIN TIME COMMENT: NORMAL
PROTHROMBIN TIME COMMENT: NORMAL

## 2024-12-16 ENCOUNTER — RX RENEWAL (OUTPATIENT)
Age: 84
End: 2024-12-16

## 2024-12-31 LAB
INR PPP: 2.4
INR PPP: 2.4
INR PPP: 2.7
PROTHROMBIN TIME COMMENT: NORMAL

## 2025-01-12 LAB
INR PPP: 3.2
PROTHROMBIN TIME COMMENT: NORMAL

## 2025-01-16 LAB
INR PPP: 2.2
PROTHROMBIN TIME COMMENT: NORMAL

## 2025-01-21 LAB
INR PPP: 3.1
PROTHROMBIN TIME COMMENT: NORMAL

## 2025-01-29 LAB
INR PPP: 3.2
PROTHROMBIN TIME COMMENT: NORMAL

## 2025-02-04 LAB
INR PPP: 2.5
PROTHROMBIN TIME COMMENT: NORMAL

## 2025-02-06 ENCOUNTER — APPOINTMENT (OUTPATIENT)
Dept: CARDIOLOGY | Facility: CLINIC | Age: 85
End: 2025-02-06

## 2025-02-12 LAB
INR PPP: 2.4
PROTHROMBIN TIME COMMENT: NORMAL

## 2025-02-18 ENCOUNTER — APPOINTMENT (OUTPATIENT)
Dept: CARDIOLOGY | Facility: CLINIC | Age: 85
End: 2025-02-18

## 2025-02-19 LAB
INR PPP: 3
PROTHROMBIN TIME COMMENT: NORMAL

## 2025-02-26 ENCOUNTER — APPOINTMENT (OUTPATIENT)
Dept: CARDIOLOGY | Facility: CLINIC | Age: 85
End: 2025-02-26
Payer: MEDICARE

## 2025-02-26 ENCOUNTER — NON-APPOINTMENT (OUTPATIENT)
Age: 85
End: 2025-02-26

## 2025-02-26 ENCOUNTER — LABORATORY RESULT (OUTPATIENT)
Age: 85
End: 2025-02-26

## 2025-02-26 VITALS
DIASTOLIC BLOOD PRESSURE: 78 MMHG | BODY MASS INDEX: 23.83 KG/M2 | WEIGHT: 114 LBS | SYSTOLIC BLOOD PRESSURE: 122 MMHG | OXYGEN SATURATION: 93 % | HEART RATE: 93 BPM

## 2025-02-26 DIAGNOSIS — I10 ESSENTIAL (PRIMARY) HYPERTENSION: ICD-10-CM

## 2025-02-26 DIAGNOSIS — I34.0 NONRHEUMATIC MITRAL (VALVE) INSUFFICIENCY: ICD-10-CM

## 2025-02-26 DIAGNOSIS — R74.8 ABNORMAL LEVELS OF OTHER SERUM ENZYMES: ICD-10-CM

## 2025-02-26 DIAGNOSIS — I07.1 RHEUMATIC TRICUSPID INSUFFICIENCY: ICD-10-CM

## 2025-02-26 LAB
INR PPP: 2.6
PROTHROMBIN TIME COMMENT: NORMAL

## 2025-02-26 PROCEDURE — 99214 OFFICE O/P EST MOD 30 MIN: CPT

## 2025-02-26 PROCEDURE — 93000 ELECTROCARDIOGRAM COMPLETE: CPT

## 2025-02-26 PROCEDURE — 36415 COLL VENOUS BLD VENIPUNCTURE: CPT

## 2025-03-01 ENCOUNTER — RX RENEWAL (OUTPATIENT)
Age: 85
End: 2025-03-01

## 2025-03-03 LAB
5NT SERPL-CCNC: 29 IU/L
ALBUMIN SERPL ELPH-MCNC: 4.4 G/DL
ALP BLD-CCNC: 438 U/L
ALT SERPL-CCNC: 39 U/L
ANION GAP SERPL CALC-SCNC: 15 MMOL/L
AST SERPL-CCNC: 52 U/L
BILIRUB SERPL-MCNC: 0.3 MG/DL
BUN SERPL-MCNC: 15 MG/DL
CALCIUM SERPL-MCNC: 9.6 MG/DL
CHLORIDE SERPL-SCNC: 105 MMOL/L
CO2 SERPL-SCNC: 23 MMOL/L
CREAT SERPL-MCNC: 0.71 MG/DL
EGFR: 83 ML/MIN/1.73M2
GLUCOSE SERPL-MCNC: 122 MG/DL
INR PPP: 1.8
POTASSIUM SERPL-SCNC: 4 MMOL/L
PROT SERPL-MCNC: 7.2 G/DL
PROTHROMBIN TIME COMMENT: NORMAL
SODIUM SERPL-SCNC: 143 MMOL/L

## 2025-03-12 LAB
INR PPP: 2.6
PROTHROMBIN TIME COMMENT: NORMAL

## 2025-03-18 LAB
INR PPP: 2.2
PROTHROMBIN TIME COMMENT: NORMAL

## 2025-03-24 LAB
INR PPP: 2.8
PROTHROMBIN TIME COMMENT: NORMAL

## 2025-04-02 LAB
INR PPP: 2.5
PROTHROMBIN TIME COMMENT: NORMAL

## 2025-04-07 LAB
INR PPP: 2.4
PROTHROMBIN TIME COMMENT: NORMAL

## 2025-04-18 LAB
INR PPP: 2.8
PROTHROMBIN TIME COMMENT: NORMAL

## 2025-04-24 LAB
INR PPP: 2.7
PROTHROMBIN TIME COMMENT: NORMAL

## 2025-05-07 LAB
INR PPP: 2.7
PROTHROMBIN TIME COMMENT: NORMAL

## 2025-05-08 LAB
INR PPP: 2.7
PROTHROMBIN TIME COMMENT: NORMAL

## 2025-05-16 LAB
INR PPP: 2.6
PROTHROMBIN TIME COMMENT: NORMAL

## 2025-05-29 LAB
INR PPP: 3.1
PROTHROMBIN TIME COMMENT: NORMAL

## 2025-06-04 ENCOUNTER — APPOINTMENT (OUTPATIENT)
Dept: CARDIOLOGY | Facility: CLINIC | Age: 85
End: 2025-06-04

## 2025-06-04 LAB
INR PPP: 3
PROTHROMBIN TIME COMMENT: NORMAL

## 2025-06-05 ENCOUNTER — APPOINTMENT (OUTPATIENT)
Dept: CARDIOLOGY | Facility: CLINIC | Age: 85
End: 2025-06-05

## 2025-06-11 ENCOUNTER — APPOINTMENT (OUTPATIENT)
Dept: CARDIOLOGY | Facility: CLINIC | Age: 85
End: 2025-06-11
Payer: MEDICARE

## 2025-06-11 VITALS
OXYGEN SATURATION: 96 % | HEART RATE: 94 BPM | DIASTOLIC BLOOD PRESSURE: 65 MMHG | BODY MASS INDEX: 24.24 KG/M2 | WEIGHT: 116 LBS | SYSTOLIC BLOOD PRESSURE: 112 MMHG

## 2025-06-11 LAB
BASOPHILS # BLD AUTO: 0.05 K/UL
BASOPHILS NFR BLD AUTO: 0.7 %
EOSINOPHIL # BLD AUTO: 0.09 K/UL
EOSINOPHIL NFR BLD AUTO: 1.3 %
HCT VFR BLD CALC: 39.5 %
HGB BLD-MCNC: 12.8 G/DL
IMM GRANULOCYTES NFR BLD AUTO: 0.4 %
LYMPHOCYTES # BLD AUTO: 1.78 K/UL
LYMPHOCYTES NFR BLD AUTO: 24.7 %
MAN DIFF?: NORMAL
MCHC RBC-ENTMCNC: 32.4 G/DL
MCHC RBC-ENTMCNC: 32.5 PG
MCV RBC AUTO: 100.3 FL
MONOCYTES # BLD AUTO: 0.44 K/UL
MONOCYTES NFR BLD AUTO: 6.1 %
NEUTROPHILS # BLD AUTO: 4.81 K/UL
NEUTROPHILS NFR BLD AUTO: 66.8 %
PLATELET # BLD AUTO: 180 K/UL
RBC # BLD: 3.94 M/UL
RBC # FLD: 15 %
WBC # FLD AUTO: 7.2 K/UL

## 2025-06-11 PROCEDURE — 36415 COLL VENOUS BLD VENIPUNCTURE: CPT

## 2025-06-11 PROCEDURE — 93000 ELECTROCARDIOGRAM COMPLETE: CPT

## 2025-06-11 PROCEDURE — 99214 OFFICE O/P EST MOD 30 MIN: CPT

## 2025-06-12 LAB
ALBUMIN SERPL ELPH-MCNC: 4.7 G/DL
ALP BLD-CCNC: 486 U/L
ALT SERPL-CCNC: 34 U/L
ANION GAP SERPL CALC-SCNC: 16 MMOL/L
AST SERPL-CCNC: 36 U/L
BILIRUB SERPL-MCNC: 0.5 MG/DL
BUN SERPL-MCNC: 13 MG/DL
CALCIUM SERPL-MCNC: 10.1 MG/DL
CHLORIDE SERPL-SCNC: 102 MMOL/L
CO2 SERPL-SCNC: 22 MMOL/L
CREAT SERPL-MCNC: 0.83 MG/DL
EGFRCR SERPLBLD CKD-EPI 2021: 68 ML/MIN/1.73M2
GLUCOSE SERPL-MCNC: 116 MG/DL
MAGNESIUM SERPL-MCNC: 2.2 MG/DL
NT-PROBNP SERPL-MCNC: 1753 PG/ML
POTASSIUM SERPL-SCNC: 4.3 MMOL/L
PROT SERPL-MCNC: 7.8 G/DL
SODIUM SERPL-SCNC: 140 MMOL/L
TSH SERPL-ACNC: 1.91 UIU/ML

## 2025-06-16 LAB — 5NT SERPL-CCNC: 33 IU/L

## 2025-06-23 LAB
INR PPP: 3
PROTHROMBIN TIME COMMENT: NORMAL

## 2025-07-01 LAB
INR PPP: 2.7
INR PPP: 2.9
INR PPP: 3.1
PROTHROMBIN TIME COMMENT: NORMAL

## 2025-07-06 LAB
INR PPP: 2.9
PROTHROMBIN TIME COMMENT: NORMAL

## 2025-07-14 ENCOUNTER — RX RENEWAL (OUTPATIENT)
Age: 85
End: 2025-07-14

## 2025-07-16 LAB
INR PPP: 2.3
PROTHROMBIN TIME COMMENT: NORMAL

## 2025-08-05 LAB
INR PPP: 2.2
PROTHROMBIN TIME COMMENT: NORMAL

## 2025-09-02 LAB
INR PPP: 2.4
INR PPP: 2.6
INR PPP: 3
PROTHROMBIN TIME COMMENT: NORMAL

## 2025-09-10 LAB
INR PPP: 2.6
PROTHROMBIN TIME COMMENT: NORMAL

## 2025-09-16 LAB
INR PPP: 3.2
PROTHROMBIN TIME COMMENT: NORMAL

## (undated) DEVICE — SAW BLADE STRYKER RECIPROCATING 77.6X0.77X11.2MM

## (undated) DEVICE — POSITIONER FOAM ABDUCTION PILLOW MED (PINK)

## (undated) DEVICE — DRAPE HIP W POUCHES 87X115X134"

## (undated) DEVICE — VENODYNE/SCD SLEEVE CALF LARGE

## (undated) DEVICE — SUT POLYSORB 2-0 36" GS-21 UNDYED

## (undated) DEVICE — PRESSURIZER FEM CNL W/O HUB MED

## (undated) DEVICE — ELCTR BOVIE PENCIL SMOKE EVACUATION

## (undated) DEVICE — POSITIONER FOAM EGG CRATE ULNAR 2PCS (PINK)

## (undated) DEVICE — SUT POLYSORB 1 36" GS-21 UNDYED

## (undated) DEVICE — GLV 8.5 PROTEXIS (WHITE)

## (undated) DEVICE — SOL IRR POUR NS 0.9% 500ML

## (undated) DEVICE — DRSG TAPE MICROFOAM 3"

## (undated) DEVICE — WARMING BLANKET UPPER ADULT

## (undated) DEVICE — Device

## (undated) DEVICE — STRYKER FEMORAL CANAL BRUSH

## (undated) DEVICE — SUCTION YANKAUER NO CONTROL VENT

## (undated) DEVICE — GLV 7.5 PROTEXIS (WHITE)

## (undated) DEVICE — STAPLER SKIN VISI-STAT 35 WIDE

## (undated) DEVICE — SUT POLYSORB 0 30" GS-21 UNDYED

## (undated) DEVICE — DRSG DERMABOND 0.7ML

## (undated) DEVICE — GLV 8 PROTEXIS (WHITE)

## (undated) DEVICE — PACK TOTAL HIP (2 PACKS)

## (undated) DEVICE — SOL IRR BAG NS 0.9% 3000ML

## (undated) DEVICE — GLV 7 PROTEXIS (WHITE)

## (undated) DEVICE — HOOD FLYTE STRYKER HELMET SHIELD

## (undated) DEVICE — GLV 6.5 PROTEXIS (WHITE)

## (undated) DEVICE — SPECIMEN CONTAINER 100ML

## (undated) DEVICE — SOL IRR POUR H2O 1000ML

## (undated) DEVICE — DRAPE 3/4 SHEET W REINFORCEMENT 56X77"

## (undated) DEVICE — DRSG AQUACEL 3.5 X 10"